# Patient Record
Sex: MALE | Race: WHITE | Employment: UNEMPLOYED | URBAN - NONMETROPOLITAN AREA
[De-identification: names, ages, dates, MRNs, and addresses within clinical notes are randomized per-mention and may not be internally consistent; named-entity substitution may affect disease eponyms.]

---

## 2020-09-14 ENCOUNTER — TELEPHONE (OUTPATIENT)
Dept: FAMILY MEDICINE CLINIC | Age: 70
End: 2020-09-14

## 2020-10-26 ENCOUNTER — TELEPHONE (OUTPATIENT)
Dept: SURGERY | Age: 70
End: 2020-10-26

## 2020-10-26 NOTE — TELEPHONE ENCOUNTER
MA attempted to contact patient to schedule an appointment for a 5 yr repeat colonoscopy consult. Patient did not answer so MA left message requesting a return call to schedule.     Electronically signed by Gwyn Davenport on 10/26/20 at 8:48 AM EDT

## 2021-03-21 ENCOUNTER — APPOINTMENT (OUTPATIENT)
Dept: GENERAL RADIOLOGY | Age: 71
End: 2021-03-21
Payer: MEDICARE

## 2021-03-21 ENCOUNTER — APPOINTMENT (OUTPATIENT)
Dept: CT IMAGING | Age: 71
End: 2021-03-21
Payer: MEDICARE

## 2021-03-21 ENCOUNTER — HOSPITAL ENCOUNTER (EMERGENCY)
Age: 71
Discharge: HOME | End: 2021-03-22
Payer: MEDICARE

## 2021-03-21 VITALS
SYSTOLIC BLOOD PRESSURE: 141 MMHG | OXYGEN SATURATION: 97 % | RESPIRATION RATE: 16 BRPM | TEMPERATURE: 97.1 F | DIASTOLIC BLOOD PRESSURE: 95 MMHG | HEART RATE: 77 BPM

## 2021-03-21 DIAGNOSIS — F10.920 ACUTE ALCOHOLIC INTOXICATION WITHOUT COMPLICATION (HCC): Primary | ICD-10-CM

## 2021-03-21 LAB
ACETAMINOPHEN LEVEL: <5 MCG/ML (ref 10–30)
ALBUMIN SERPL-MCNC: 3.9 G/DL (ref 3.5–5.2)
ALP BLD-CCNC: 89 U/L (ref 40–129)
ALT SERPL-CCNC: 12 U/L (ref 0–40)
AMPHETAMINE SCREEN, URINE: NOT DETECTED
ANION GAP SERPL CALCULATED.3IONS-SCNC: 17 MMOL/L (ref 7–16)
AST SERPL-CCNC: 24 U/L (ref 0–39)
BARBITURATE SCREEN URINE: NOT DETECTED
BASOPHILS ABSOLUTE: 0.05 E9/L (ref 0–0.2)
BASOPHILS RELATIVE PERCENT: 0.5 % (ref 0–2)
BENZODIAZEPINE SCREEN, URINE: NOT DETECTED
BILIRUB SERPL-MCNC: <0.2 MG/DL (ref 0–1.2)
BUN BLDV-MCNC: 11 MG/DL (ref 8–23)
CALCIUM SERPL-MCNC: 9.2 MG/DL (ref 8.6–10.2)
CANNABINOID SCREEN URINE: NOT DETECTED
CHLORIDE BLD-SCNC: 105 MMOL/L (ref 98–107)
CO2: 20 MMOL/L (ref 22–29)
COCAINE METABOLITE SCREEN URINE: NOT DETECTED
CREAT SERPL-MCNC: 0.9 MG/DL (ref 0.7–1.2)
EOSINOPHILS ABSOLUTE: 0.24 E9/L (ref 0.05–0.5)
EOSINOPHILS RELATIVE PERCENT: 2.5 % (ref 0–6)
ETHANOL: 281 MG/DL (ref 0–0.08)
FENTANYL SCREEN, URINE: NOT DETECTED
GFR AFRICAN AMERICAN: >60
GFR NON-AFRICAN AMERICAN: >60 ML/MIN/1.73
GLUCOSE BLD-MCNC: 108 MG/DL (ref 74–99)
HCT VFR BLD CALC: 45.1 % (ref 37–54)
HEMOGLOBIN: 15.1 G/DL (ref 12.5–16.5)
IMMATURE GRANULOCYTES #: 0.02 E9/L
IMMATURE GRANULOCYTES %: 0.2 % (ref 0–5)
LYMPHOCYTES ABSOLUTE: 4.55 E9/L (ref 1.5–4)
LYMPHOCYTES RELATIVE PERCENT: 47.8 % (ref 20–42)
Lab: NORMAL
MAGNESIUM: 2 MG/DL (ref 1.6–2.6)
MCH RBC QN AUTO: 32.5 PG (ref 26–35)
MCHC RBC AUTO-ENTMCNC: 33.5 % (ref 32–34.5)
MCV RBC AUTO: 97.2 FL (ref 80–99.9)
METHADONE SCREEN, URINE: NOT DETECTED
MONOCYTES ABSOLUTE: 0.66 E9/L (ref 0.1–0.95)
MONOCYTES RELATIVE PERCENT: 6.9 % (ref 2–12)
NEUTROPHILS ABSOLUTE: 4 E9/L (ref 1.8–7.3)
NEUTROPHILS RELATIVE PERCENT: 42.1 % (ref 43–80)
OPIATE SCREEN URINE: NOT DETECTED
OXYCODONE URINE: NOT DETECTED
PDW BLD-RTO: 12.8 FL (ref 11.5–15)
PHENCYCLIDINE SCREEN URINE: NOT DETECTED
PLATELET # BLD: 255 E9/L (ref 130–450)
PMV BLD AUTO: 9.6 FL (ref 7–12)
POTASSIUM SERPL-SCNC: 3.6 MMOL/L (ref 3.5–5)
RBC # BLD: 4.64 E12/L (ref 3.8–5.8)
SALICYLATE, SERUM: <0.3 MG/DL (ref 0–30)
SODIUM BLD-SCNC: 142 MMOL/L (ref 132–146)
TOTAL PROTEIN: 7.4 G/DL (ref 6.4–8.3)
TRICYCLIC ANTIDEPRESSANTS SCREEN SERUM: NEGATIVE NG/ML
TROPONIN: <0.01 NG/ML (ref 0–0.03)
WBC # BLD: 9.5 E9/L (ref 4.5–11.5)

## 2021-03-21 PROCEDURE — 83735 ASSAY OF MAGNESIUM: CPT

## 2021-03-21 PROCEDURE — 84484 ASSAY OF TROPONIN QUANT: CPT

## 2021-03-21 PROCEDURE — 80053 COMPREHEN METABOLIC PANEL: CPT

## 2021-03-21 PROCEDURE — 2580000003 HC RX 258: Performed by: NURSE PRACTITIONER

## 2021-03-21 PROCEDURE — 72125 CT NECK SPINE W/O DYE: CPT

## 2021-03-21 PROCEDURE — 87635 SARS-COV-2 COVID-19 AMP PRB: CPT

## 2021-03-21 PROCEDURE — 85025 COMPLETE CBC W/AUTO DIFF WBC: CPT

## 2021-03-21 PROCEDURE — 6360000002 HC RX W HCPCS: Performed by: NURSE PRACTITIONER

## 2021-03-21 PROCEDURE — 99284 EMERGENCY DEPT VISIT MOD MDM: CPT

## 2021-03-21 PROCEDURE — 36415 COLL VENOUS BLD VENIPUNCTURE: CPT

## 2021-03-21 PROCEDURE — 80307 DRUG TEST PRSMV CHEM ANLYZR: CPT

## 2021-03-21 PROCEDURE — 70450 CT HEAD/BRAIN W/O DYE: CPT

## 2021-03-21 PROCEDURE — 82077 ASSAY SPEC XCP UR&BREATH IA: CPT

## 2021-03-21 PROCEDURE — 71046 X-RAY EXAM CHEST 2 VIEWS: CPT

## 2021-03-21 PROCEDURE — 96374 THER/PROPH/DIAG INJ IV PUSH: CPT

## 2021-03-21 PROCEDURE — 93005 ELECTROCARDIOGRAM TRACING: CPT | Performed by: NURSE PRACTITIONER

## 2021-03-21 PROCEDURE — 80143 DRUG ASSAY ACETAMINOPHEN: CPT

## 2021-03-21 PROCEDURE — 96375 TX/PRO/DX INJ NEW DRUG ADDON: CPT

## 2021-03-21 PROCEDURE — 80179 DRUG ASSAY SALICYLATE: CPT

## 2021-03-21 RX ORDER — LORAZEPAM 2 MG/ML
2 INJECTION INTRAMUSCULAR
Status: DISCONTINUED | OUTPATIENT
Start: 2021-03-21 | End: 2021-03-22 | Stop reason: HOSPADM

## 2021-03-21 RX ORDER — LORAZEPAM 2 MG/ML
3 INJECTION INTRAMUSCULAR
Status: DISCONTINUED | OUTPATIENT
Start: 2021-03-21 | End: 2021-03-22 | Stop reason: HOSPADM

## 2021-03-21 RX ORDER — LORAZEPAM 1 MG/1
4 TABLET ORAL
Status: DISCONTINUED | OUTPATIENT
Start: 2021-03-21 | End: 2021-03-22 | Stop reason: HOSPADM

## 2021-03-21 RX ORDER — LORAZEPAM 1 MG/1
3 TABLET ORAL
Status: DISCONTINUED | OUTPATIENT
Start: 2021-03-21 | End: 2021-03-22 | Stop reason: HOSPADM

## 2021-03-21 RX ORDER — 0.9 % SODIUM CHLORIDE 0.9 %
1000 INTRAVENOUS SOLUTION INTRAVENOUS ONCE
Status: COMPLETED | OUTPATIENT
Start: 2021-03-21 | End: 2021-03-22

## 2021-03-21 RX ORDER — SODIUM CHLORIDE 0.9 % (FLUSH) 0.9 %
10 SYRINGE (ML) INJECTION PRN
Status: DISCONTINUED | OUTPATIENT
Start: 2021-03-21 | End: 2021-03-22 | Stop reason: HOSPADM

## 2021-03-21 RX ORDER — LORAZEPAM 2 MG/ML
1 INJECTION INTRAMUSCULAR
Status: DISCONTINUED | OUTPATIENT
Start: 2021-03-21 | End: 2021-03-22 | Stop reason: HOSPADM

## 2021-03-21 RX ORDER — 0.9 % SODIUM CHLORIDE 0.9 %
1000 INTRAVENOUS SOLUTION INTRAVENOUS ONCE
Status: DISCONTINUED | OUTPATIENT
Start: 2021-03-21 | End: 2021-03-22 | Stop reason: HOSPADM

## 2021-03-21 RX ORDER — THIAMINE HYDROCHLORIDE 100 MG/ML
100 INJECTION, SOLUTION INTRAMUSCULAR; INTRAVENOUS DAILY
Status: DISCONTINUED | OUTPATIENT
Start: 2021-03-21 | End: 2021-03-22 | Stop reason: HOSPADM

## 2021-03-21 RX ORDER — SODIUM CHLORIDE 0.9 % (FLUSH) 0.9 %
10 SYRINGE (ML) INJECTION EVERY 12 HOURS SCHEDULED
Status: DISCONTINUED | OUTPATIENT
Start: 2021-03-21 | End: 2021-03-22 | Stop reason: HOSPADM

## 2021-03-21 RX ORDER — LORAZEPAM 2 MG/ML
1 INJECTION INTRAMUSCULAR ONCE
Status: COMPLETED | OUTPATIENT
Start: 2021-03-21 | End: 2021-03-21

## 2021-03-21 RX ORDER — LORAZEPAM 2 MG/ML
4 INJECTION INTRAMUSCULAR
Status: DISCONTINUED | OUTPATIENT
Start: 2021-03-21 | End: 2021-03-22 | Stop reason: HOSPADM

## 2021-03-21 RX ORDER — LORAZEPAM 1 MG/1
1 TABLET ORAL
Status: DISCONTINUED | OUTPATIENT
Start: 2021-03-21 | End: 2021-03-22 | Stop reason: HOSPADM

## 2021-03-21 RX ORDER — LORAZEPAM 1 MG/1
2 TABLET ORAL
Status: DISCONTINUED | OUTPATIENT
Start: 2021-03-21 | End: 2021-03-22 | Stop reason: HOSPADM

## 2021-03-21 RX ADMIN — SODIUM CHLORIDE 1000 ML: 9 INJECTION, SOLUTION INTRAVENOUS at 19:50

## 2021-03-21 RX ADMIN — THIAMINE HYDROCHLORIDE 100 MG: 100 INJECTION, SOLUTION INTRAMUSCULAR; INTRAVENOUS at 19:53

## 2021-03-21 RX ADMIN — LORAZEPAM 1 MG: 2 INJECTION INTRAMUSCULAR; INTRAVENOUS at 19:53

## 2021-03-21 NOTE — ED PROVIDER NOTES
Independent   HPI:  3/21/21, Time: 6:36 PM EDT         Jb Hammonds is a 78 y.o. male presenting to the ED for alcohol intoxication. Patient presents to the emergency department after being brought in by EMS. Neighbors called police after patient was laying in the middle of the sidewalk. Upon police arrival patient noted to be intoxicated. EMS was called for transfer to the hospital.  EMS reports that patient actually lives right across the street from where he was. Patient is awake alert oriented x2-3, originally he thought that he was at his house. He is pleasant, and intoxicated. States that he was just celebrating his birthday which is actually in October. Patient states that he just wanted to celebrate early. Patient otherwise denies any chest pain, shortness of breath, abdominal pain also denies any nausea, vomiting or diarrhea. He does admit that he does drink alcohol daily, states that he drinks 6 beers and some whiskey today. Patient is denying any drug use. Patient states that he did fall and just decided to lay down on the ground instead of getting up. Patient is not on any anticoagulant therapy. Denies feeling weak or dizzy. Patient otherwise very pleasant. No associated depression denies any suicidal or homicidal ideations denies any hallucinations. Patient otherwise without any unusual weakness, there is no signs of injury or trauma on him as well as no unusual lacerations abrasions or areas of ecchymosis. Review of Systems:   A complete review of systems was performed and pertinent positives and negatives are stated within HPI, all other systems reviewed and are negative.          --------------------------------------------- PAST HISTORY ---------------------------------------------  Past Medical History:  has a past medical history of Hypertension and Urinary incontinence. Past Surgical History:  has no past surgical history on file.     Social History:  reports that he has been smoking. He has a 40.00 pack-year smoking history. He has never used smokeless tobacco. He reports current alcohol use of about 1.0 standard drinks of alcohol per week. He reports that he does not use drugs. Family History: family history includes Cancer in his father. The patients home medications have been reviewed. Allergies: Patient has no known allergies.     -------------------------------------------------- RESULTS -------------------------------------------------  All laboratory and radiology results have been personally reviewed by myself   LABS:  Results for orders placed or performed during the hospital encounter of 03/21/21   COVID-19, Rapid    Specimen: Nasopharyngeal Swab   Result Value Ref Range    SARS-CoV-2, NAAT Not Detected Not Detected   CBC Auto Differential   Result Value Ref Range    WBC 9.5 4.5 - 11.5 E9/L    RBC 4.64 3.80 - 5.80 E12/L    Hemoglobin 15.1 12.5 - 16.5 g/dL    Hematocrit 45.1 37.0 - 54.0 %    MCV 97.2 80.0 - 99.9 fL    MCH 32.5 26.0 - 35.0 pg    MCHC 33.5 32.0 - 34.5 %    RDW 12.8 11.5 - 15.0 fL    Platelets 735 344 - 246 E9/L    MPV 9.6 7.0 - 12.0 fL    Neutrophils % 42.1 (L) 43.0 - 80.0 %    Immature Granulocytes % 0.2 0.0 - 5.0 %    Lymphocytes % 47.8 (H) 20.0 - 42.0 %    Monocytes % 6.9 2.0 - 12.0 %    Eosinophils % 2.5 0.0 - 6.0 %    Basophils % 0.5 0.0 - 2.0 %    Neutrophils Absolute 4.00 1.80 - 7.30 E9/L    Immature Granulocytes # 0.02 E9/L    Lymphocytes Absolute 4.55 (H) 1.50 - 4.00 E9/L    Monocytes Absolute 0.66 0.10 - 0.95 E9/L    Eosinophils Absolute 0.24 0.05 - 0.50 E9/L    Basophils Absolute 0.05 0.00 - 0.20 E9/L   Comprehensive Metabolic Panel   Result Value Ref Range    Sodium 142 132 - 146 mmol/L    Potassium 3.6 3.5 - 5.0 mmol/L    Chloride 105 98 - 107 mmol/L    CO2 20 (L) 22 - 29 mmol/L    Anion Gap 17 (H) 7 - 16 mmol/L    Glucose 108 (H) 74 - 99 mg/dL    BUN 11 8 - 23 mg/dL    CREATININE 0.9 0.7 - 1.2 mg/dL    GFR Non-African American >60 >=60 mL/min/1.73    GFR African American >60     Calcium 9.2 8.6 - 10.2 mg/dL    Total Protein 7.4 6.4 - 8.3 g/dL    Albumin 3.9 3.5 - 5.2 g/dL    Total Bilirubin <0.2 0.0 - 1.2 mg/dL    Alkaline Phosphatase 89 40 - 129 U/L    ALT 12 0 - 40 U/L    AST 24 0 - 39 U/L   Magnesium   Result Value Ref Range    Magnesium 2.0 1.6 - 2.6 mg/dL   Urine Drug Screen   Result Value Ref Range    Amphetamine Screen, Urine NOT DETECTED Negative <1000 ng/mL    Barbiturate Screen, Ur NOT DETECTED Negative < 200 ng/mL    Benzodiazepine Screen, Urine NOT DETECTED Negative < 200 ng/mL    Cannabinoid Scrn, Ur NOT DETECTED Negative < 50ng/mL    Cocaine Metabolite Screen, Urine NOT DETECTED Negative < 300 ng/mL    Opiate Scrn, Ur NOT DETECTED Negative < 300ng/mL    PCP Screen, Urine NOT DETECTED Negative < 25 ng/mL    Methadone Screen, Urine NOT DETECTED Negative <300 ng/mL    Oxycodone Urine NOT DETECTED Negative <100 ng/mL    FENTANYL SCREEN, URINE NOT DETECTED Negative <1 ng/mL    Drug Screen Comment: see below    Serum Drug Screen   Result Value Ref Range    Ethanol Lvl 281 mg/dL    Acetaminophen Level <5.0 (L) 10.0 - 56.0 mcg/mL    Salicylate, Serum <3.2 0.0 - 30.0 mg/dL    TCA Scrn NEGATIVE Cutoff:300 ng/mL   Troponin   Result Value Ref Range    Troponin <0.01 0.00 - 0.03 ng/mL   Ethanol   Result Value Ref Range    Ethanol Lvl 149 mg/dL       RADIOLOGY:  Interpreted by Radiologist.  XR CHEST (2 VW)   Final Result   No acute process. CT HEAD WO CONTRAST   Final Result   No acute intracranial abnormality. CT CERVICAL SPINE WO CONTRAST   Final Result   No acute cervical spine fracture or malalignment. Prominent degenerative changes of the cervical spine.             ------------------------- NURSING NOTES AND VITALS REVIEWED ---------------------------   The nursing notes within the ED encounter and vital signs as below have been reviewed.    BP (!) 141/89   Pulse 80   Temp 97.1 °F (36.2 °C)   Resp 17   SpO2 95% Oxygen Saturation Interpretation: Normal      ---------------------------------------------------PHYSICAL EXAM--------------------------------------      Constitutional/General: Alert and oriented x3, intoxicated  Head: Normocephalic and atraumatic  Eyes: PERRL, EOMI  Mouth: Oropharynx clear, handling secretions, no trismus  Neck: Supple, full ROM,   Pulmonary: Lungs clear to auscultation bilaterally, no wheezes, rales, or rhonchi. Not in respiratory distress  Cardiovascular:  Regular rate and rhythm, no murmurs, gallops, or rubs. 2+ distal pulses  Abdomen: Soft, non tender, non distended,   Extremities: Moves all extremities x 4. Warm and well perfused  Skin: warm and dry without rash  Neurologic: GCS 15, cranial nerves II through XII grossly intact. No acute neurovascular deficit noted. Speech clear and coherent strength strong and equal.  Psych: Normal Affect      ------------------------------ ED COURSE/MEDICAL DECISION MAKING----------------------  Medications   0.9 % sodium chloride bolus (0 mLs Intravenous Stopped 3/22/21 0021)   LORazepam (ATIVAN) injection 1 mg (1 mg Intravenous Given 3/21/21 1953)         ED COURSE:       Medical Decision Making:    Plan will be for labs will also obtain imaging, will medicate for symptom relief. CIWA protocol initiated. Twelve-lead EKG interpreted showed a heart rate of 79, normal sinus rhythm, no acute ST elevation or depression noted. Left axis deviation. Labs resulted Covid test negative. CBC negative, chemistry panel unremarkable, magnesium level normal, urine drug screen negative. Initial alcohol level 281. Troponin negative. Patient with negative CT scan of the brain negative CT cervical spine of chest x-ray negative. Patient was started on a CIWA protocol. He was provided with 1 dose of Ativan. Patient completely awake alert oriented x4 he is requesting to leave but unable to find a sober ride home. Patient is not at all hypoxic.   He denies any pain denies any chest pain, shortness of breath or abdominal pain. Patient with repeat alcohol level of 149. Patient did eat 2 different meals here. He is overall completely nontoxic. He is able to make safe and sound decisions. Plan will be for discharge home. Patient made aware of avoiding alcohol the importance of seeking treatment for alcohol detox but is declining it at this time. Patient otherwise nontoxic. Patient will be safely discharged home    Counseling: The emergency provider has spoken with the patient and discussed todays results, in addition to providing specific details for the plan of care and counseling regarding the diagnosis and prognosis. Questions are answered at this time and they are agreeable with the plan.      --------------------------------- IMPRESSION AND DISPOSITION ---------------------------------    IMPRESSION  1. Acute alcoholic intoxication without complication ([facility] [address]        DISPOSITION  Disposition: Discharge to home  Patient condition is good      NOTE: This report was transcribed using voice recognition software.  Every effort was made to ensure accuracy; however, inadvertent computerized transcription errors may be present     Coral SIM Dennis CNP  03/22/21 7253

## 2021-03-22 LAB
EKG ATRIAL RATE: 79 BPM
EKG P AXIS: 66 DEGREES
EKG P-R INTERVAL: 164 MS
EKG Q-T INTERVAL: 416 MS
EKG QRS DURATION: 98 MS
EKG QTC CALCULATION (BAZETT): 477 MS
EKG R AXIS: 14 DEGREES
EKG T AXIS: 62 DEGREES
EKG VENTRICULAR RATE: 79 BPM
ETHANOL: 149 MG/DL (ref 0–0.08)
SARS-COV-2, NAAT: NOT DETECTED

## 2021-03-22 PROCEDURE — 82077 ASSAY SPEC XCP UR&BREATH IA: CPT

## 2021-03-22 PROCEDURE — 93010 ELECTROCARDIOGRAM REPORT: CPT | Performed by: INTERNAL MEDICINE

## 2022-11-02 ENCOUNTER — APPOINTMENT (OUTPATIENT)
Dept: CT IMAGING | Age: 72
DRG: 572 | End: 2022-11-02
Payer: MEDICARE

## 2022-11-02 ENCOUNTER — APPOINTMENT (OUTPATIENT)
Dept: GENERAL RADIOLOGY | Age: 72
DRG: 572 | End: 2022-11-02
Payer: MEDICARE

## 2022-11-02 ENCOUNTER — HOSPITAL ENCOUNTER (INPATIENT)
Age: 72
LOS: 2 days | Discharge: HOME HEALTH | DRG: 572 | End: 2022-11-04
Attending: EMERGENCY MEDICINE | Admitting: FAMILY MEDICINE
Payer: MEDICARE

## 2022-11-02 DIAGNOSIS — T78.3XXA ANGIOEDEMA, INITIAL ENCOUNTER: Primary | ICD-10-CM

## 2022-11-02 DIAGNOSIS — L08.9 WOUND INFECTION: ICD-10-CM

## 2022-11-02 DIAGNOSIS — T14.8XXA WOUND INFECTION: ICD-10-CM

## 2022-11-02 LAB
ALBUMIN SERPL-MCNC: 4.5 G/DL (ref 3.5–5.2)
ALP BLD-CCNC: 96 U/L (ref 40–129)
ALT SERPL-CCNC: 12 U/L (ref 0–40)
ANION GAP SERPL CALCULATED.3IONS-SCNC: 14 MMOL/L (ref 7–16)
AST SERPL-CCNC: 23 U/L (ref 0–39)
BASOPHILS ABSOLUTE: 0.03 E9/L (ref 0–0.2)
BASOPHILS RELATIVE PERCENT: 0.4 % (ref 0–2)
BILIRUB SERPL-MCNC: 0.3 MG/DL (ref 0–1.2)
BUN BLDV-MCNC: 12 MG/DL (ref 6–23)
CALCIUM SERPL-MCNC: 9.9 MG/DL (ref 8.6–10.2)
CHLORIDE BLD-SCNC: 105 MMOL/L (ref 98–107)
CO2: 23 MMOL/L (ref 22–29)
CREAT SERPL-MCNC: 1 MG/DL (ref 0.7–1.2)
EOSINOPHILS ABSOLUTE: 0.22 E9/L (ref 0.05–0.5)
EOSINOPHILS RELATIVE PERCENT: 2.7 % (ref 0–6)
GFR SERPL CREATININE-BSD FRML MDRD: >60 ML/MIN/1.73
GLUCOSE BLD-MCNC: 101 MG/DL (ref 74–99)
HCT VFR BLD CALC: 44.6 % (ref 37–54)
HEMOGLOBIN: 15 G/DL (ref 12.5–16.5)
IMMATURE GRANULOCYTES #: 0.02 E9/L
IMMATURE GRANULOCYTES %: 0.2 % (ref 0–5)
LYMPHOCYTES ABSOLUTE: 2.16 E9/L (ref 1.5–4)
LYMPHOCYTES RELATIVE PERCENT: 26.5 % (ref 20–42)
MCH RBC QN AUTO: 32.4 PG (ref 26–35)
MCHC RBC AUTO-ENTMCNC: 33.6 % (ref 32–34.5)
MCV RBC AUTO: 96.3 FL (ref 80–99.9)
MONOCYTES ABSOLUTE: 0.5 E9/L (ref 0.1–0.95)
MONOCYTES RELATIVE PERCENT: 6.1 % (ref 2–12)
NEUTROPHILS ABSOLUTE: 5.23 E9/L (ref 1.8–7.3)
NEUTROPHILS RELATIVE PERCENT: 64.1 % (ref 43–80)
PDW BLD-RTO: 12.4 FL (ref 11.5–15)
PLATELET # BLD: 294 E9/L (ref 130–450)
PMV BLD AUTO: 9.5 FL (ref 7–12)
POTASSIUM SERPL-SCNC: 4.3 MMOL/L (ref 3.5–5)
RBC # BLD: 4.63 E12/L (ref 3.8–5.8)
SODIUM BLD-SCNC: 142 MMOL/L (ref 132–146)
TOTAL PROTEIN: 8.2 G/DL (ref 6.4–8.3)
WBC # BLD: 8.2 E9/L (ref 4.5–11.5)

## 2022-11-02 PROCEDURE — 71260 CT THORAX DX C+: CPT

## 2022-11-02 PROCEDURE — 85025 COMPLETE CBC W/AUTO DIFF WBC: CPT

## 2022-11-02 PROCEDURE — 80053 COMPREHEN METABOLIC PANEL: CPT

## 2022-11-02 PROCEDURE — 99285 EMERGENCY DEPT VISIT HI MDM: CPT

## 2022-11-02 PROCEDURE — 6360000002 HC RX W HCPCS: Performed by: EMERGENCY MEDICINE

## 2022-11-02 PROCEDURE — 71045 X-RAY EXAM CHEST 1 VIEW: CPT

## 2022-11-02 PROCEDURE — 1200000000 HC SEMI PRIVATE

## 2022-11-02 PROCEDURE — 2580000003 HC RX 258: Performed by: EMERGENCY MEDICINE

## 2022-11-02 PROCEDURE — 87040 BLOOD CULTURE FOR BACTERIA: CPT

## 2022-11-02 PROCEDURE — 6370000000 HC RX 637 (ALT 250 FOR IP): Performed by: EMERGENCY MEDICINE

## 2022-11-02 PROCEDURE — 2500000003 HC RX 250 WO HCPCS: Performed by: EMERGENCY MEDICINE

## 2022-11-02 PROCEDURE — 96365 THER/PROPH/DIAG IV INF INIT: CPT

## 2022-11-02 PROCEDURE — 6360000004 HC RX CONTRAST MEDICATION: Performed by: RADIOLOGY

## 2022-11-02 RX ORDER — SODIUM CHLORIDE 0.9 % (FLUSH) 0.9 %
10 SYRINGE (ML) INJECTION PRN
Status: DISCONTINUED | OUTPATIENT
Start: 2022-11-02 | End: 2022-11-04 | Stop reason: HOSPADM

## 2022-11-02 RX ORDER — PREDNISONE 20 MG/1
60 TABLET ORAL ONCE
Status: COMPLETED | OUTPATIENT
Start: 2022-11-02 | End: 2022-11-02

## 2022-11-02 RX ORDER — POLYETHYLENE GLYCOL 3350 17 G/17G
17 POWDER, FOR SOLUTION ORAL DAILY PRN
Status: DISCONTINUED | OUTPATIENT
Start: 2022-11-02 | End: 2022-11-04 | Stop reason: HOSPADM

## 2022-11-02 RX ORDER — SODIUM CHLORIDE 0.9 % (FLUSH) 0.9 %
10 SYRINGE (ML) INJECTION EVERY 12 HOURS SCHEDULED
Status: DISCONTINUED | OUTPATIENT
Start: 2022-11-02 | End: 2022-11-04 | Stop reason: HOSPADM

## 2022-11-02 RX ORDER — TRANEXAMIC ACID 100 MG/ML
1000 INJECTION, SOLUTION INTRAVENOUS ONCE
Status: DISCONTINUED | OUTPATIENT
Start: 2022-11-02 | End: 2022-11-02

## 2022-11-02 RX ORDER — SODIUM CHLORIDE 9 MG/ML
INJECTION, SOLUTION INTRAVENOUS PRN
Status: DISCONTINUED | OUTPATIENT
Start: 2022-11-02 | End: 2022-11-04 | Stop reason: HOSPADM

## 2022-11-02 RX ORDER — ACETAMINOPHEN 650 MG/1
650 SUPPOSITORY RECTAL EVERY 6 HOURS PRN
Status: DISCONTINUED | OUTPATIENT
Start: 2022-11-02 | End: 2022-11-04 | Stop reason: HOSPADM

## 2022-11-02 RX ORDER — ENOXAPARIN SODIUM 100 MG/ML
40 INJECTION SUBCUTANEOUS DAILY
Status: DISCONTINUED | OUTPATIENT
Start: 2022-11-03 | End: 2022-11-04 | Stop reason: HOSPADM

## 2022-11-02 RX ORDER — ONDANSETRON 2 MG/ML
4 INJECTION INTRAMUSCULAR; INTRAVENOUS EVERY 6 HOURS PRN
Status: DISCONTINUED | OUTPATIENT
Start: 2022-11-02 | End: 2022-11-04 | Stop reason: HOSPADM

## 2022-11-02 RX ORDER — ACETAMINOPHEN 325 MG/1
650 TABLET ORAL EVERY 6 HOURS PRN
Status: DISCONTINUED | OUTPATIENT
Start: 2022-11-02 | End: 2022-11-04 | Stop reason: HOSPADM

## 2022-11-02 RX ORDER — ALBUTEROL SULFATE 2.5 MG/3ML
2.5 SOLUTION RESPIRATORY (INHALATION) EVERY 6 HOURS PRN
Status: DISCONTINUED | OUTPATIENT
Start: 2022-11-02 | End: 2022-11-04 | Stop reason: HOSPADM

## 2022-11-02 RX ORDER — AMLODIPINE BESYLATE 10 MG/1
10 TABLET ORAL DAILY
Status: DISCONTINUED | OUTPATIENT
Start: 2022-11-03 | End: 2022-11-04 | Stop reason: HOSPADM

## 2022-11-02 RX ORDER — PROMETHAZINE HYDROCHLORIDE 12.5 MG/1
12.5 TABLET ORAL EVERY 6 HOURS PRN
Status: DISCONTINUED | OUTPATIENT
Start: 2022-11-02 | End: 2022-11-04 | Stop reason: HOSPADM

## 2022-11-02 RX ADMIN — IOPAMIDOL 60 ML: 755 INJECTION, SOLUTION INTRAVENOUS at 23:39

## 2022-11-02 RX ADMIN — PREDNISONE 60 MG: 20 TABLET ORAL at 20:21

## 2022-11-02 RX ADMIN — CEFEPIME 2000 MG: 2 INJECTION, POWDER, FOR SOLUTION INTRAVENOUS at 21:11

## 2022-11-02 RX ADMIN — TRANEXAMIC ACID 1000 MG: 100 INJECTION, SOLUTION INTRAVENOUS at 16:05

## 2022-11-02 RX ADMIN — VANCOMYCIN HYDROCHLORIDE 1750 MG: 10 INJECTION, POWDER, LYOPHILIZED, FOR SOLUTION INTRAVENOUS at 21:49

## 2022-11-02 ASSESSMENT — PAIN - FUNCTIONAL ASSESSMENT: PAIN_FUNCTIONAL_ASSESSMENT: NONE - DENIES PAIN

## 2022-11-02 NOTE — ED PROVIDER NOTES
Department of Emergency Medicine   ED  Provider Note  Admit Date/RoomTime: 11/2/2022  3:46 PM  ED Room: 07/07          History of Present Illness:  11/2/22, Time: 5:24 PM EDT  Chief Complaint   Patient presents with    Other     [facility], swelling to upper lip, denies SOB, pt reports swelling started around noon today. Pt is on Lisinopril daily                Blayne Lee is a 81 y.o. male presenting to the ED for angioedema. Patient states his lip began to swell around 10 AM this morning. Came on gradually, nothing makes it better or worse, no associated pain. He is on lisinopril. It got worse around noon, was instructed to present to the ER. Denies any throat swelling. Denies difficulty clearing secretions. Denies any shortness of breath. Denies any fever, chills, nausea, vomiting, change in bowel or bladder, paresthesias, or any other symptoms or complaints. Review of Systems:   Pertinent positives and negatives are stated within HPI, all other systems reviewed and are negative.        --------------------------------------------- PAST HISTORY ---------------------------------------------  Past Medical History:  has a past medical history of Hypertension and Urinary incontinence. Past Surgical History:  has no past surgical history on file. Social History:  reports that he has been smoking. He has a 40.00 pack-year smoking history. He has never used smokeless tobacco. He reports current alcohol use of about 1.0 standard drink per week. He reports that he does not use drugs. Family History: family history includes Cancer in his father. . Unless otherwise noted, family history is non contributory    The patients home medications have been reviewed.     Allergies: Lisinopril        ---------------------------------------------------PHYSICAL EXAM--------------------------------------    Constitutional/General: Alert and oriented x3  Head: Normocephalic and atraumatic  Eyes: PERRL, EOMI, sclera non icteric  Mouth: Oropharynx clear, handling secretions, no trismus, no asymmetry of the posterior oropharynx or uvular edema. Significant upper lip edema  Neck: Supple, full ROM, no stridor, no meningeal signs  Respiratory: Lungs clear to auscultation bilaterally, no wheezes, rales, or rhonchi. Not in respiratory distress  Cardiovascular:  Regular rate. Regular rhythm. 2+ distal pulses. Equal extremity pulses. Chest: No chest wall tenderness. Large draining necrotic wound to the left pectoral area, no crepitus  GI:  Abdomen Soft, Non tender, Non distended. No rebound, guarding, or rigidity. No pulsatile masses. Musculoskeletal: Moves all extremities x 4. Warm and well perfused, no clubbing, cyanosis, or edema. Capillary refill <3 seconds  Integument: skin warm and dry. No rashes. Neurologic: GCS 15, no focal deficits, symmetric strength 5/5 in the upper and lower extremities bilaterally  Psychiatric: Normal Affect          -------------------------------------------------- RESULTS -------------------------------------------------  I have personally reviewed all laboratory and imaging results for this patient. Results are listed below.      LABS: (Lab results interpreted by me)  No results found for this visit on 11/02/22.,       RADIOLOGY:  Interpreted by Radiologist unless otherwise specified  XR CHEST PORTABLE    (Results Pending)         EKG Interpretation  Interpreted by emergency department physician, Dr. Eduar Hayes             ------------------------- NURSING NOTES AND VITALS REVIEWED ---------------------------   The nursing notes within the ED encounter and vital signs as below have been reviewed by myself  BP (!) 188/118   Pulse 80   Temp 98.1 °F (36.7 °C) (Oral)   Resp 18   Ht 5' 9\" (1.753 m)   Wt 187 lb (84.8 kg)   SpO2 96%   BMI 27.62 kg/m²     Oxygen Saturation Interpretation: Normal    The patients available past medical records and past encounters were reviewed. ------------------------------ ED COURSE/MEDICAL DECISION MAKING----------------------  Medications   vancomycin (VANCOCIN) 1,750 mg in dextrose 5 % 500 mL IVPB (has no administration in time range)   cefepime (MAXIPIME) 2,000 mg in sodium chloride 0.9 % 50 mL IVPB ([id number]) (has no administration in time range)   tranexamic acid (CYKLOKAPRON) 1,000 mg in sodium chloride 0.9 % 100 mL IVPB (0 mg IntraVENous Stopped 11/2/22 1654)   predniSONE (DELTASONE) tablet 60 mg (60 mg Oral Given 11/2/22 2021)           The cardiac monitor revealed sinus with a heart rate in the 80s as interpreted by me. The cardiac monitor was ordered secondary to the patient's angioedema and to monitor the patient for dysrhythmia. CPT [id number]         Medical Decision Making:    Patient was given TXA. Labs and imaging are pending. Reevaluation, angioedema is much improved. Swelling improved, airway pain, overall well-appearing. However, given his necrotic wound, surgery consulted, patient be admitted. Please note that the withdrawal or failure to initiate urgent interventions for this patient would likely result in a life threatening deterioration or permanent disability. Accordingly this patient received 30 minutes of critical care time, excluding separately billable procedures. Counseling: The emergency provider has spoken with the patient and discussed todays results, in addition to providing specific details for the plan of care and counseling regarding the diagnosis and prognosis. Questions are answered at this time and they are agreeable with the plan.       --------------------------------- IMPRESSION AND DISPOSITION ---------------------------------    IMPRESSION  1. Angioedema, initial encounter    2. Wound infection        DISPOSITION  Disposition: Admit to telemetry  Patient condition is stable        NOTE: This report was transcribed using voice recognition software.  Every effort was made to ensure accuracy; however, inadvertent computerized transcription errors may be present        Gisel Rivas MD  11/02/22 6575       Gisel Rivas MD  11/02/22 2752

## 2022-11-03 LAB
ANION GAP SERPL CALCULATED.3IONS-SCNC: 13 MMOL/L (ref 7–16)
BASOPHILS ABSOLUTE: 0.01 E9/L (ref 0–0.2)
BASOPHILS RELATIVE PERCENT: 0.1 % (ref 0–2)
BUN BLDV-MCNC: 10 MG/DL (ref 6–23)
CALCIUM SERPL-MCNC: 9.8 MG/DL (ref 8.6–10.2)
CHLORIDE BLD-SCNC: 103 MMOL/L (ref 98–107)
CO2: 23 MMOL/L (ref 22–29)
CREAT SERPL-MCNC: 0.9 MG/DL (ref 0.7–1.2)
EOSINOPHILS ABSOLUTE: 0 E9/L (ref 0.05–0.5)
EOSINOPHILS RELATIVE PERCENT: 0 % (ref 0–6)
GFR SERPL CREATININE-BSD FRML MDRD: >60 ML/MIN/1.73
GLUCOSE BLD-MCNC: 152 MG/DL (ref 74–99)
HCT VFR BLD CALC: 45.5 % (ref 37–54)
HEMOGLOBIN: 15.6 G/DL (ref 12.5–16.5)
IMMATURE GRANULOCYTES #: 0.03 E9/L
IMMATURE GRANULOCYTES %: 0.4 % (ref 0–5)
LYMPHOCYTES ABSOLUTE: 1.03 E9/L (ref 1.5–4)
LYMPHOCYTES RELATIVE PERCENT: 13.9 % (ref 20–42)
MCH RBC QN AUTO: 33.4 PG (ref 26–35)
MCHC RBC AUTO-ENTMCNC: 34.3 % (ref 32–34.5)
MCV RBC AUTO: 97.4 FL (ref 80–99.9)
MONOCYTES ABSOLUTE: 0.08 E9/L (ref 0.1–0.95)
MONOCYTES RELATIVE PERCENT: 1.1 % (ref 2–12)
NEUTROPHILS ABSOLUTE: 6.24 E9/L (ref 1.8–7.3)
NEUTROPHILS RELATIVE PERCENT: 84.5 % (ref 43–80)
PDW BLD-RTO: 12.3 FL (ref 11.5–15)
PLATELET # BLD: 304 E9/L (ref 130–450)
PMV BLD AUTO: 9.3 FL (ref 7–12)
POTASSIUM REFLEX MAGNESIUM: 4.4 MMOL/L (ref 3.5–5)
RBC # BLD: 4.67 E12/L (ref 3.8–5.8)
SODIUM BLD-SCNC: 139 MMOL/L (ref 132–146)
WBC # BLD: 7.4 E9/L (ref 4.5–11.5)

## 2022-11-03 PROCEDURE — 6370000000 HC RX 637 (ALT 250 FOR IP): Performed by: FAMILY MEDICINE

## 2022-11-03 PROCEDURE — 99221 1ST HOSP IP/OBS SF/LOW 40: CPT | Performed by: SURGERY

## 2022-11-03 PROCEDURE — 36415 COLL VENOUS BLD VENIPUNCTURE: CPT

## 2022-11-03 PROCEDURE — 1200000000 HC SEMI PRIVATE

## 2022-11-03 PROCEDURE — 2580000003 HC RX 258: Performed by: FAMILY MEDICINE

## 2022-11-03 PROCEDURE — 87205 SMEAR GRAM STAIN: CPT

## 2022-11-03 PROCEDURE — 0JB60ZZ EXCISION OF CHEST SUBCUTANEOUS TISSUE AND FASCIA, OPEN APPROACH: ICD-10-PCS

## 2022-11-03 PROCEDURE — 80048 BASIC METABOLIC PNL TOTAL CA: CPT

## 2022-11-03 PROCEDURE — 87186 SC STD MICRODIL/AGAR DIL: CPT

## 2022-11-03 PROCEDURE — 6360000002 HC RX W HCPCS: Performed by: FAMILY MEDICINE

## 2022-11-03 PROCEDURE — 87070 CULTURE OTHR SPECIMN AEROBIC: CPT

## 2022-11-03 PROCEDURE — 87075 CULTR BACTERIA EXCEPT BLOOD: CPT

## 2022-11-03 PROCEDURE — 85025 COMPLETE CBC W/AUTO DIFF WBC: CPT

## 2022-11-03 PROCEDURE — 87077 CULTURE AEROBIC IDENTIFY: CPT

## 2022-11-03 PROCEDURE — 88304 TISSUE EXAM BY PATHOLOGIST: CPT

## 2022-11-03 RX ADMIN — CEFEPIME 2000 MG: 2 INJECTION, POWDER, FOR SOLUTION INTRAVENOUS at 09:03

## 2022-11-03 RX ADMIN — SODIUM CHLORIDE, PRESERVATIVE FREE 10 ML: 5 INJECTION INTRAVENOUS at 00:50

## 2022-11-03 RX ADMIN — VANCOMYCIN HYDROCHLORIDE 1500 MG: 10 INJECTION, POWDER, LYOPHILIZED, FOR SOLUTION INTRAVENOUS at 15:57

## 2022-11-03 RX ADMIN — AMLODIPINE BESYLATE 10 MG: 10 TABLET ORAL at 08:59

## 2022-11-03 RX ADMIN — CEFEPIME 2000 MG: 2 INJECTION, POWDER, FOR SOLUTION INTRAVENOUS at 21:02

## 2022-11-03 RX ADMIN — SODIUM CHLORIDE, PRESERVATIVE FREE 10 ML: 5 INJECTION INTRAVENOUS at 09:04

## 2022-11-03 RX ADMIN — ENOXAPARIN SODIUM 40 MG: 100 INJECTION SUBCUTANEOUS at 09:00

## 2022-11-03 RX ADMIN — SODIUM CHLORIDE, PRESERVATIVE FREE 10 ML: 5 INJECTION INTRAVENOUS at 21:02

## 2022-11-03 NOTE — CARE COORDINATION
Per notes presented to ED for swelling of his upper lip-patient was being prepared to be discharged when it was noticed that he has a large wound on the left chest that is draining pus. Patient reports he has had this wound for about a week or 2. Patient was given cefepime and vancomycin. IV cefepime q 12. General surgery was consulted. Bedside I&D done Wound was packed with iodoform covered with a heavy drainage pad. Met with patient to discuss role/transition of care. He lives alone in an apartment on 10 th floor with elevator access. He has no family support. He does not have a PCP. No DME,DANIELLE OR HHC. He works @ [employer] on [employer] and [employer]. His discharge plan is to return home and will ride the bus home. Electronically signed by Yulia Ramirez RN on 11/3/2022 at 1:54 PM

## 2022-11-03 NOTE — PROGRESS NOTES
Pharmacy Consultation Note  (Antibiotic Dosing and Monitoring)    Initial consult date: 11/3/2022  Consulting physician/provider: Micheal Macias DO  Drug: Vancomycin  Indication: Skin & Soft Tissue Infection    Age/  Gender Height Weight IBW  Allergy Information   72 y.o./male 5' 9\" (175.3 cm) 187 lb (84.8 kg)     Ideal body weight: 70.7 kg (155 lb 13.8 oz)  Adjusted ideal body weight: 76.3 kg (168 lb 5.1 oz)   Lisinopril      Renal Function:  Recent Labs     11/02/22 2033   BUN 12   CREATININE 1.0     No intake or output data in the 24 hours ending 11/03/22 0002    Vancomycin Monitoring:  Trough:  No results for input(s): VANCOTROUGH in the last 72 hours. Random:  No results for input(s): VANCORANDOM in the last 72 hours. No results for input(s): Reina Minus in the last 72 hours. Historical Cultures:  Organism   Date Value Ref Range Status   01/20/2016 Coagulase Negative Staphylococci (A)  Final     No results for input(s): BC in the last 72 hours. Vancomycin Administration Times:  Recent vancomycin administrations                     vancomycin (VANCOCIN) 1,750 mg in dextrose 5 % 500 mL IVPB (mg) 1,750 mg New Bag 11/02/22 2149                    Assessment:  Patient is a 81 y.o. male who has been initiated on vancomycin  Estimated Creatinine Clearance: 69 mL/min (based on SCr of 1 mg/dL). Plan:   Will check vancomycin levels when appropriate  Will continue to monitor renal function   Pharmacy to follow      Joanne Marquez PharmD, [facility], BCPS 11/3/2022 12:02 AM

## 2022-11-03 NOTE — PROGRESS NOTES
Physician Progress Note      PATIENT:               Lai Parks  CSN #:                  [id number]  :                       1950  ADMIT DATE:       2022 3:46 PM  [address] [place] DATE:  RESPONDING  PROVIDER #:        MOIRA WAGNER          QUERY TEXT:    Patient admitted with angioedema and noted to have a left chest wound. Per Op   note dated 11/3 documentation of debridement. To accurately reflect the   procedure performed please document if debridement was excisional or   nonexcisional and the deepest depth of tissue removed as down to and   including: The medical record reflects the following:  Risk Factors: left chest wound  Clinical Indicators: per procedure note 11/3 An incision through area of   fluctuance was made using an 11 blade. Thick necrotic debris was expelled. Using blunt dissection, the tissue was debrided back until healthy, viable,   bleeding tissue was obtained; Wound was packed with iodoform covered with a   heavy drainage pad. Treatment: debridement, wound care and dressing changes    Thank you  Brigette ROMERON, RN, CCDS  Clinical Documentation Improvement  Options provided:  -- Nonexcisional debridement of skin  -- Excisional debridement of skin  -- Nonexcisional debridement of subcutaneous tissue  -- Excisional debridement of subcutaneous tissue  -- Nonexcisional debridement of fascia  -- Excisional debridement of fascia  -- Other - I will add my own diagnosis  -- Disagree - Not applicable / Not valid  -- Disagree - Clinically unable to determine / Unknown  -- Refer to Clinical Documentation Reviewer    PROVIDER RESPONSE TEXT:    Excisional debridement of subcutaneous tissue of left chest was performed   during procedure on 11/3.     Query created by: Leopold Coin on 5498 3:15 PM      Electronically signed by:  Eve Wilkins 11/3/2022 7:09 PM

## 2022-11-03 NOTE — PROGRESS NOTES
Pharmacy Consultation Note  (Antibiotic Dosing and Monitoring)    Initial consult date: 11/3/2022  Consulting physician/provider: Lisa Middleton DO  Drug: Vancomycin  Indication: Skin & Soft Tissue Infection    Age/  Gender Height Weight IBW  Allergy Information   72 y.o./male 5' 9\" (175.3 cm) 187 lb (84.8 kg)     Ideal body weight: 70.7 kg (155 lb 13.8 oz)  Adjusted ideal body weight: 76.3 kg (168 lb 5.1 oz)   Lisinopril      Renal Function:  Recent Labs     11/02/22 2033 11/03/22  0445   BUN 12 10   CREATININE 1.0 0.9     No intake or output data in the 24 hours ending 11/03/22 0809    Vancomycin Monitoring:  Trough:  No results for input(s): VANCOTROUGH in the last 72 hours. Random:  No results for input(s): VANCORANDOM in the last 72 hours. No results for input(s): Jocelyn Dage in the last 72 hours. Historical Cultures:  Organism   Date Value Ref Range Status   01/20/2016 Coagulase Negative Staphylococci (A)  Final     No results for input(s): BC in the last 72 hours. Vancomycin Administration Times:  Recent vancomycin administrations                     vancomycin (VANCOCIN) 1,750 mg in dextrose 5 % 500 mL IVPB (mg) 1,750 mg New Bag 11/02/22 2149             Assessment:  Patient is a 81 y.o. male who has been initiated on vancomycin and cefepime for necrotic L pec wound 2/2 abscess. S/p bedside I&D on 11/3. Patient received vancomycin 1,750 mg IV x 1 in ED. Estimated Creatinine Clearance: 76 mL/min (based on SCr of 0.9 mg/dL). Plan:  Initiate vancomycin 1,500 mg IV every 18 hours (eAUC/IVANNA = 540, Trough = 16 mcg/mL). Will check random vancomycin level tomorrow (11/4) with morning labs. Will continue to monitor renal function; will check SCr tomorrow. Pharmacy to follow.     Lalita Simons, PharmD  PGY1 Pharmacy Resident 11/3/2022 8:10 AM

## 2022-11-03 NOTE — CONSULTS
GENERAL SURGERY  CONSULT NOTE  11/3/2022    Physician Consulted: Dr. Latasha Gillespie  Reason for Consult: L pec wound  Referring Physician: Dr. Eduar Hayes     PER Hammond is a 81 y.o. male who initially presented to the emergency present for angioedema as a reaction to his lisinopril, however was found to have a left pec necrotic wound. Patient states he has noticed this wound for the past 2 weeks. States he likely had a scab there was picking at it. He started noticing some drainage but did not think much of it. Denies any fevers or chills. No nausea or vomiting. Denies any pain around the site. He denies noticing any previous breast masses. Denies any recent weight loss, night sweats. Reports a 40 pack year hx. States he recently quit smoking. He denies any family history of breast cancer. Denies any family history of cancer. Denies any history of diabetes. WBC 8.2      Past Medical History:   Diagnosis Date    Hypertension     Urinary incontinence        No past surgical history on file. Medications Prior to Admission:    Prior to Admission medications    Medication Sig Start Date End Date Taking? Authorizing Provider   amLODIPine (NORVASC) 10 MG tablet Take 1 tablet by mouth daily 16   Ata Yamilet, DO   albuterol sulfate HFA (PROAIR HFA) 108 (90 BASE) MCG/ACT inhaler Inhale 2 puffs into the lungs every 6 hours as needed for Wheezing 16   Ata Yamilet, DO       Allergies   Allergen Reactions    Lisinopril Angioedema       Family History   Problem Relation Age of Onset    Cancer Father        Social History     Tobacco Use    Smoking status: Some Days     Packs/day: 1.00     Years: 40.00     Pack years: 40.00     Types: Cigarettes     Last attempt to quit: 10/11/2015     Years since quittin.0    Smokeless tobacco: Never    Tobacco comments:     Former Smoker, quit 1 month ago. Substance Use Topics    Alcohol use:  Yes     Alcohol/week: 1.0 standard drink     Types: 1 Cans of beer per week    Drug use: No         Review of Systems   General ROS: negative  Hematological and Lymphatic ROS: negative  Respiratory ROS: no cough, shortness of breath, or wheezing  Cardiovascular ROS: no chest pain or dyspnea on exertion  Gastrointestinal ROS: no abdominal pain, change in bowel habits, or black or bloody stools  Genito-Urinary ROS: no dysuria, trouble voiding, or hematuria  Musculoskeletal ROS: negative      PHYSICAL EXAM:    Vitals:    11/03/22 0045   BP: (!) 172/105   Pulse: 71   Resp: 15   Temp:    SpO2: 95%       General Appearance:  awake, alert, oriented, in no acute distress   Skin:  necrotic L chest wound      Head/face:  angioedema of lips   Eyes:  No gross abnormalities. Lungs:  Normal expansion. Clear to auscultation. No rales, rhonchi, or wheezing. Heart:  Heart regular rate and rhythm. R chest palpable mass   Abdomen:  Soft, non-tender, normal bowel sounds. No bruits, organomegaly or masses. Extremities: Extremities warm to touch, pink, with no edema. L axillary LAD     LABS:  CBC  Recent Labs     11/02/22 2033   WBC 8.2   HGB 15.0   HCT 44.6        BMP  Recent Labs     11/02/22 2033      K 4.3      CO2 23   BUN 12   CREATININE 1.0   CALCIUM 9.9     Liver Function  Recent Labs     11/02/22 2033   BILITOT 0.3   AST 23   ALT 12   ALKPHOS 96   PROT 8.2   LABALBU 4.5     No results for input(s): LACTATE in the last 72 hours. No results for input(s): INR, PTT in the last 72 hours. Invalid input(s): PT    RADIOLOGY    XR CHEST PORTABLE    Result Date: 11/2/2022  EXAMINATION: ONE XRAY VIEW OF THE CHEST 11/2/2022 8:38 pm COMPARISON: 03/21/2021 HISTORY: ORDERING SYSTEM PROVIDED HISTORY: Shortness of breath TECHNOLOGIST PROVIDED HISTORY: Reason for exam:->Shortness of breath What reading provider will be dictating this exam?->CRC FINDINGS: The lungs are without acute focal process. There is no effusion or pneumothorax.  The cardiomediastinal silhouette is without acute process. The osseous structures are without acute process. No acute process.          ASSESSMENT:  81 y.o. male with necrotic L pec wound likely 2/2 to abscess     PLAN:  - CT chest with contrast   - bedside debridement of the wound   - will obtain culture and surgical pathology   - continue IV abx     Electronically signed by Jennifer Paula MD on 11/3/22 at 1:06 AM EDT

## 2022-11-03 NOTE — H&P
Hospitalist History & Physical      PCP: Lisandra Irizarry MD    Date of Service: Pt seen/examined on 11/2/2022     Chief Complaint:  had concerns including Other (Pt presents to pivot desk with angioedema, swelling to upper lip, denies SOB, pt reports swelling started around noon today. Pt is on Lisinopril daily). History Of Present Illness:    Mr. Len Osuna, a 81y.o. year old male  who  has a past medical history of Hypertension and Urinary incontinence. Patient presented to the emergency department with swelling of his upper lip. This began about 10:00 this morning. Became much worse around noon thus prompting him to go to the emergency department. Vital signs are within normal limits and stable although he is hypertensive with a pressure of 188/118. The patient is afebrile. Work-up was essentially unremarkable including laboratory studies and imaging. There was some improvement with his lip swelling and the patient was being prepared to be discharged when it was noticed that he has a large wound on the left chest that is draining pus. Patient reports he has had this wound for about a week or 2. Patient was given cefepime and vancomycin. General surgery was consulted. Medicine was consulted for admission. Past Medical History:   Diagnosis Date    Hypertension     Urinary incontinence        No past surgical history on file. Prior to Admission medications    Medication Sig Start Date End Date Taking? Authorizing Provider   amLODIPine (NORVASC) 10 MG tablet Take 1 tablet by mouth daily 6/1/16   Isabel Montesinos DO   albuterol sulfate HFA (PROAIR HFA) 108 (90 BASE) MCG/ACT inhaler Inhale 2 puffs into the lungs every 6 hours as needed for Wheezing 1/20/16   Isabel Montesinos DO         Allergies:  Lisinopril    Social History:    TOBACCO:   reports that he has been smoking. He has a 40.00 pack-year smoking history.  He has never used smokeless tobacco.  ETOH:   reports current alcohol use of about 1.0 standard drink per week. Family History:    Reviewed in detail and negative for DM, CAD, Cancer, CVA. Positive as follows\"      Problem Relation Age of Onset    Cancer Father        REVIEW OF SYSTEMS:   Pertinent positives as noted in the HPI. All other systems reviewed and negative. PHYSICAL EXAM:  BP (!) 188/118   Pulse 80   Temp 98.1 °F (36.7 °C) (Oral)   Resp 18   Ht 5' 9\" (1.753 m)   Wt 187 lb (84.8 kg)   SpO2 96%   BMI 27.62 kg/m²   General appearance: No apparent distress, appears stated age and cooperative. HEENT: Normal cephalic, atraumatic without obvious deformity. Pupils equal, round, and reactive to light. Extra ocular muscles intact. Conjunctivae/corneas clear. Upper lip edema  Neck: Supple, with full range of motion. No jugular venous distention. Trachea midline. Respiratory: Clear to auscultation bilaterally  Cardiovascular: Regular rate and rhythm  Abdomen: Soft, nontender, nondistended  Musculoskeletal: No clubbing, cyanosis, edema of bilateral lower extremities. Brisk capillary refill. Skin: Wound to left chest with purulent drainage  Neurologic:  Neurovascularly intact without any focal sensory/motor deficits. Cranial nerves: II-XII intact, grossly non-focal.  Psychiatric: Alert and oriented, thought content appropriate, normal insight    Reviewed EKG and CXR personally      CBC:   Recent Labs     11/02/22 2033   WBC 8.2   RBC 4.63   HGB 15.0   HCT 44.6   MCV 96.3   RDW 12.4        BMP: No results for input(s): NA, K, CL, CO2, BUN, CREATININE, CA, MG, PHOS in the last 72 hours. LFT:  No results for input(s): PROT, ALB, ALKPHOS, ALT, AST, BILITOT, AMYLASE, LIPASE in the last 72 hours. CE:  No results for input(s): [place] in the last 72 hours. PT/INR: No results for input(s): INR, APTT in the last 72 hours. BNP: No results for input(s): BNP in the last 72 hours.   ESR: No results found for: SEDRATE  CRP: No results found for: CRP  D Dimer: No results found for: DDIMER   Folate and B12: No results found for: [id number], No results found for: FOLATE  Lactic Acid: No results found for: LACTA  Thyroid Studies: No results found for: TSH, B3GZWQR, V0XCKVI, THYROIDAB    Oupatient labs:  Lab Results   Component Value Date    CHOL 202 (H) 07/07/2015    TRIG 121 07/07/2015    HDL 69 07/07/2015    LDLCALC 109 (H) 07/07/2015    PSA 0.74 10/12/2015    INR 1.1 12/18/2015       Urinalysis:    Lab Results   Component Value Date/Time    BLOODU neg 08/11/2015 10:53 AM    SPECGRAV 1.030 08/11/2015 10:53 AM    GLUCOSEU neg 08/11/2015 10:53 AM       Imaging:  No results found. ASSESSMENT:  -Left chest wound infection  -Angioedema  -Hypertension, poorly controlled      PLAN:  -Admit to medicine  -Consult general surgery  -Pharmacy to dose vancomycin  -Cefepime 2000 g twice daily  -Blood and tissue cultures  -Telemetry  -Continue home medications        Diet: No diet orders on file  Code Status: Prior  Surrogate decision maker confirmed with patient:   Extended Emergency Contact Information  Primary Emergency Contact: [address] [place] [address] Phone: [phone]  Relation: Other  Secondary Emergency Contact: [address] Phone: [phone]  Mobile Phone: [phone]  Relation: Brother/Sister    DVT Prophylaxis: []Lovenox []Heparin []PCD [] [address] []Encouraged ambulation  Disposition: []Med/Surg [] Intermediate [] ICU/CCU  Admit status: [] Observation [] Inpatient     +++++++++++++++++++++++++++++++++++++++++++++++++  Kel Danielle, DO  +++++++++++++++++++++++++++++++++++++++++++++++++  NOTE: This report was transcribed using voice recognition software. Every effort was made to ensure accuracy; however, inadvertent computerized transcription errors may be present.

## 2022-11-03 NOTE — PLAN OF CARE
Problem: Skin/Tissue Integrity  Goal: Absence of new skin breakdown  Description: 1. Monitor for areas of redness and/or skin breakdown  2. Assess vascular access sites hourly  3. Every 4-6 hours minimum:  Change oxygen saturation probe site  4. Every 4-6 hours:  If on nasal continuous positive airway pressure, respiratory therapy assess nares and determine need for appliance change or resting period.   11/3/2022 1036 by Usha Hassan RN  Outcome: Progressing  11/3/2022 1036 by Usha Hassan RN  Outcome: Progressing     Problem: Safety - Adult  Goal: Free from fall injury  11/3/2022 1036 by Usha Hassan RN  Outcome: Progressing  11/3/2022 1036 by Usha Hassan RN  Outcome: Progressing

## 2022-11-03 NOTE — PROGRESS NOTES
[address]      PCP: Milly Bueno MD    Date of Service: Pt seen/examined on 11/3/2022     Chief Complaint:  had concerns including Other (Pt presents to pivot desk with angioedema, swelling to upper lip, denies SOB, pt reports swelling started around noon today. Pt is on Lisinopril daily). History Of Present Illness: Patient history reviewed with interview at bedside as transcribed by the admitting physician  Mr. Barbara Razo, a 81y.o. year old male  who  has a past medical history of Hypertension and Urinary incontinence. Patient presented to the emergency department with swelling of his upper lip. This began about 10:00 this morning. Became much worse around noon thus prompting him to go to the emergency department. Vital signs are within normal limits and stable although he is hypertensive with a pressure of 188/118. The patient is afebrile. Work-up was essentially unremarkable including laboratory studies and imaging. There was some improvement with his lip swelling and the patient was being prepared to be discharged when it was noticed that he has a large wound on the left chest that is draining pus. Patient reports he has had this wound for about a week or 2. Patient was given cefepime and vancomycin. General surgery was consulted. Medicine was consulted for admission. This morning he states he feels fine he has no complaints      Past Medical History:   Diagnosis Date    Hypertension     Urinary incontinence        No past surgical history on file. Prior to Admission medications    Medication Sig Start Date End Date Taking?  Authorizing Provider   amLODIPine (NORVASC) 10 MG tablet Take 1 tablet by mouth daily 6/1/16   Millicent Villela DO   albuterol sulfate HFA (PROAIR HFA) 108 (90 BASE) MCG/ACT inhaler Inhale 2 puffs into the lungs every 6 hours as needed for Wheezing 1/20/16   Millicent Villela DO         Allergies:  Lisinopril    Social History:    TOBACCO:   reports that he has been smoking. He has a 40.00 pack-year smoking history. He has never used smokeless tobacco.  ETOH:   reports current alcohol use of about 1.0 standard drink per week. Family History:    Reviewed in detail and negative for DM, CAD, Cancer, CVA. Positive as follows\"      Problem Relation Age of Onset    Cancer Father        REVIEW OF SYSTEMS:   Pertinent positives as noted in the HPI. All other systems reviewed and negative. PHYSICAL EXAM:  BP (!) 161/96   Pulse 75   Temp 97.9 °F (36.6 °C) (Oral)   Resp 22   Ht 5' 9\" (1.753 m)   Wt 187 lb (84.8 kg)   SpO2 96%   BMI 27.62 kg/m²   General appearance: No apparent distress, appears stated age and cooperative. HEENT: Normal cephalic, atraumatic without obvious deformity. Pupils equal, round, and reactive to light. Extra ocular muscles intact. Conjunctivae/corneas clear. Upper lip edema  Neck: Supple, with full range of motion. No jugular venous distention. Trachea midline. Respiratory: Clear to auscultation bilaterally  Cardiovascular: Regular rate and rhythm  Abdomen: Soft, nontender, nondistended  Musculoskeletal: No clubbing, cyanosis, edema of bilateral lower extremities. Brisk capillary refill. Skin: Wound to left chest with purulent drainage-I&D completed wound packed  Neurologic:  Neurovascularly intact without any focal sensory/motor deficits.  Cranial nerves: II-XII intact, grossly non-focal.  Psychiatric: Alert and oriented, thought content appropriate, normal insight    Reviewed EKG and CXR personally      CBC:   Recent Labs     11/02/22 2033 11/03/22  0445   WBC 8.2 7.4   RBC 4.63 4.67   HGB 15.0 15.6   HCT 44.6 45.5   MCV 96.3 97.4   RDW 12.4 12.3    304     BMP:   Recent Labs     11/02/22 2033 11/03/22  0445    139   K 4.3 4.4    103   CO2 23 23   BUN 12 10   CREATININE 1.0 0.9     LFT:  Recent Labs     11/02/22 2033   PROT 8.2   ALKPHOS 96   ALT 12   AST 23   BILITOT 0.3     CE:  No results for input(s): Lucia [place]oc[place] in the last 72 hours. PT/INR: No results for input(s): INR, APTT in the last 72 hours. BNP: No results for input(s): BNP in the last 72 hours. ESR: No results found for: SEDRATE  CRP: No results found for: CRP  D Dimer: No results found for: DDIMER   Folate and B12: No results found for: LCFUVHPO03, No results found for: FOLATE  Lactic Acid: No results found for: LACTA  Thyroid Studies: No results found for: TSH, L3IYOAD, R3AZNAA, THYROIDAB    Oupatient labs:  Lab Results   Component Value Date    CHOL 202 (H) 07/07/2015    TRIG 121 07/07/2015    HDL 69 07/07/2015    LDLCALC 109 (H) 07/07/2015    PSA 0.74 10/12/2015    INR 1.1 12/18/2015       Urinalysis:    Lab Results   Component Value Date/Time    BLOODU neg 08/11/2015 10:53 AM    SPECGRAV 1.030 08/11/2015 10:53 AM    GLUCOSEU neg 08/11/2015 10:53 AM       Imaging:  No results found. ASSESSMENT:  -Left chest wound infection  -Angioedema  -Hypertension, poorly controlled      PLAN:  -Admit to medicine  -Consult general surgery  -Pharmacy to dose vancomycin  -Cefepime 2000 g twice daily  -Blood and tissue cultures  -Telemetry  -Continue home medications        Diet: ADULT DIET; Regular  Code Status: Full Code  Surrogate decision maker confirmed with patient:   Extended Emergency Contact Information  Primary Emergency Contact: [facility] Phone: [phone]  Relation: Other  Secondary Emergency Contact: Yenny Howe  [place] Phone: [phone]  Mobile Phone: [phone]  Relation: Brother/Sister    DVT Prophylaxis: []Lovenox []Heparin []PCD [] [address] []Encouraged ambulation  Disposition: []Med/Surg [] Intermediate [] ICU/CCU  Admit status: [] Observation [] Inpatient     +++++++++++++++++++++++++++++++++++++++++++++++++  Cata Thakkar MD  +++++++++++++++++++++++++++++++++++++++++++++++++  NOTE: This report was transcribed using voice recognition software.  Every effort was made to ensure accuracy; however, inadvertent computerized transcription errors may be present.

## 2022-11-03 NOTE — PROGRESS NOTES
[facility]  SURGICAL INTENSIVE CARE UNIT (SICU)  ATTENDING PHYSICIAN CRITICAL CARE PROGRESS NOTE     I have examined the patient, reviewed the record,and discussed the case with the APN/  Resident. I have reviewed all relevant labs and imaging data. Please refer to the  APN/ resident's note. I agree with the  assessment and plan with the following corrections/ additions,  remaining ROS (-)     Please refer to resident consult note 11/3 Dr. Lara [place] site with packing in place continue packing changes and wound care.  OK for DC from surgical stand point follow up in the office for pathology     Zbigniew Anthony MD

## 2022-11-03 NOTE — ED NOTES
This RN noticed large necrotic wound on left chest above nipple, bloody pus draining from wound. Dr Jocelyn Barker notified.        Jose Peña RN  11/02/22 2200

## 2022-11-03 NOTE — PROCEDURES
The patient was placed in the supine position. The left chest was prepped with Betadine and draped in a sterile fashion. Local anesthesia was obtained by infiltration using 5cc of 1% Lidocaine with epinephrine. An incision through area of fluctuance was made using an 11 blade. Thick necrotic debris was expelled. Using blunt dissection, the tissue was debrided back until healthy, viable, bleeding tissue was obtained. Hemostasis was obtained. The wound was copiously irrigated with sterile saline. Wound was packed with iodoform covered with a heavy drainage pad. There was minimal bleeding that was controlled with pressure     The patient tolerated the procedure well. Dr. Harper Silva was immediately available during the entire procedure. The patient tolerated the procedure well without complications. MEASUREMENTS: 4cm    Skin edge was taken for surgical pathology.  Cultures was taken at this time     Rocio Huerta MD  Resident, PGY-1  11/3/2022  3:14 AM

## 2022-11-04 VITALS
OXYGEN SATURATION: 96 % | SYSTOLIC BLOOD PRESSURE: 136 MMHG | RESPIRATION RATE: 18 BRPM | DIASTOLIC BLOOD PRESSURE: 90 MMHG | WEIGHT: 187 LBS | BODY MASS INDEX: 27.7 KG/M2 | HEIGHT: 69 IN | TEMPERATURE: 97 F | HEART RATE: 82 BPM

## 2022-11-04 LAB
CREAT SERPL-MCNC: 0.9 MG/DL (ref 0.7–1.2)
GFR SERPL CREATININE-BSD FRML MDRD: >60 ML/MIN/1.73
VANCOMYCIN RANDOM: 17.6 MCG/ML (ref 5–40)

## 2022-11-04 PROCEDURE — 80202 ASSAY OF VANCOMYCIN: CPT

## 2022-11-04 PROCEDURE — 2580000003 HC RX 258: Performed by: FAMILY MEDICINE

## 2022-11-04 PROCEDURE — 6370000000 HC RX 637 (ALT 250 FOR IP): Performed by: FAMILY MEDICINE

## 2022-11-04 PROCEDURE — 36415 COLL VENOUS BLD VENIPUNCTURE: CPT

## 2022-11-04 PROCEDURE — 6370000000 HC RX 637 (ALT 250 FOR IP): Performed by: INTERNAL MEDICINE

## 2022-11-04 PROCEDURE — 82565 ASSAY OF CREATININE: CPT

## 2022-11-04 PROCEDURE — 6360000002 HC RX W HCPCS: Performed by: FAMILY MEDICINE

## 2022-11-04 RX ORDER — CEFDINIR 300 MG/1
300 CAPSULE ORAL EVERY 12 HOURS SCHEDULED
Status: DISCONTINUED | OUTPATIENT
Start: 2022-11-04 | End: 2022-11-04 | Stop reason: HOSPADM

## 2022-11-04 RX ORDER — LABETALOL 100 MG/1
100 TABLET, FILM COATED ORAL EVERY 12 HOURS SCHEDULED
Qty: 60 TABLET | Refills: 3 | Status: SHIPPED | OUTPATIENT
Start: 2022-11-04

## 2022-11-04 RX ORDER — CEFDINIR 300 MG/1
300 CAPSULE ORAL 2 TIMES DAILY
Qty: 20 CAPSULE | Refills: 0 | Status: SHIPPED | OUTPATIENT
Start: 2022-11-04 | End: 2022-11-14

## 2022-11-04 RX ORDER — LABETALOL 100 MG/1
100 TABLET, FILM COATED ORAL EVERY 12 HOURS SCHEDULED
Status: DISCONTINUED | OUTPATIENT
Start: 2022-11-04 | End: 2022-11-04 | Stop reason: HOSPADM

## 2022-11-04 RX ADMIN — LABETALOL HYDROCHLORIDE 100 MG: 100 TABLET, FILM COATED ORAL at 09:35

## 2022-11-04 RX ADMIN — AMLODIPINE BESYLATE 10 MG: 10 TABLET ORAL at 09:35

## 2022-11-04 RX ADMIN — SODIUM CHLORIDE, PRESERVATIVE FREE 10 ML: 5 INJECTION INTRAVENOUS at 09:36

## 2022-11-04 RX ADMIN — CEFDINIR 300 MG: 300 CAPSULE ORAL at 09:35

## 2022-11-04 RX ADMIN — ENOXAPARIN SODIUM 40 MG: 100 INJECTION SUBCUTANEOUS at 09:35

## 2022-11-04 NOTE — DISCHARGE INSTR - COC
Continuity of Care Form    Patient Name: Yg Schmitz   :  1950  MRN:  [id number]    Admit date:  2022  Discharge date:  ***    Code Status Order: Full Code   Advance Directives:     Admitting Physician:  Lisa Middleton DO  PCP: Amol Byrne MD    Discharging Nurse: [facility] Unit/Room#: 0853/2580-G  Discharging Unit Phone Number: ***    Emergency Contact:   Extended Emergency Contact Information  Primary Emergency Contact: [facility] Phone: [phone]  Relation: Other  Secondary Emergency Contact: [address] Phone: [phone]  Mobile Phone: [phone]  Relation: Brother/Sister    Past Surgical History:  No past surgical history on file. Immunization History:   Immunization History   Administered Date(s) Administered    Influenza Virus Vaccine 10/12/2015    Pneumococcal Polysaccharide ([id number]) 10/12/2015       Active Problems:  Patient Active Problem List   Diagnosis Code    Essential hypertension I10    Smoking trying to quit Z72.0    Benign non-nodular prostatic hyperplasia with lower urinary tract symptoms N40.1    Diverticulosis of large intestine without hemorrhage K57.30    Colon polyp K63.5    Wound infection T14. 8XXA, L08.9       Isolation/Infection:   Isolation            No Isolation          Patient Infection Status       Infection Onset Added Last Indicated Last Indicated By Review Planned Expiration Resolved Resolved By    None active    Resolved    COVID-19 (Rule Out) 21 COVID-19, Rapid (Ordered)   21 Rule-Out Test Resulted            Nurse Assessment:  Last Vital Signs: BP (!) 136/90   Pulse 82   Temp 97 °F (36.1 °C) (Temporal)   Resp 18   Ht 5' 9\" (1.753 m)   Wt 187 lb (84.8 kg)   SpO2 96%   BMI 27.62 kg/m²     Last documented pain score (0-10 scale):    Last Weight:   Wt Readings from Last 1 Encounters:   22 187 lb (84.8 kg)     Mental Status:  {IP PT MENTAL STATUS:}    IV Access:  [facility] IV ZAFKRF:244948522}    Nursing Mobility/ADLs:  Walking   {CHP DME OCWR:333000727}  Transfer  {CHP DME IPYS:918683314}  B[facility]  [facility]}  Dressing  {CHP DME DXLE:561357229}  Toileting  {CHP DME VHXH:608083366}  Feeding  {CHP DME CLSL:111330543}  Med Admin  {P DME BAOS:376094789}  Med Delivery   { YESSI MED Delivery:274119774}    Wound Care Documentation and Therapy:  Incision 12/18/15 Hand Left (Active)   Number of days: 0094       Wound 22 Chest Left (Active)   Dressing Status New dressing applied 22   Dressing/Treatment Moist to dry;Gauze dressing/dressing sponge;ABD 22   Wound Assessment Pink/red 22   Drainage Amount Small 22   Drainage Description Yellow 22 0254   Odor None 22   Number of days: 1        Elimination:  Continence: Bowel: {YES / FW:96092}  Bladder: {YES / FS:34883}  Urinary Catheter: {Urinary Catheter:371304242}   Colostomy/Ileostomy/Ileal Conduit: {YES / QE:62523}       Date of Last BM: ***  No intake or output data in the 24 hours ending 22 1215  I/O last 3 completed shifts:   In: 120 [P.O.:120]  Out: -     Safety Concerns:     [address] [employer] Safety Concerns:585246217}    Impairments/Disabilities:      [address] [employer] Impairments/Disabilities:107396826}    Nutrition Therapy:  Current Nutrition Therapy:   [address] [employer] Diet List:589736535}    Routes of Feeding: {P DME Other Feedings:307593486}  Liquids: {Slp liquid thickness:68185}  Daily Fluid Restriction: {CHP DME Yes amt example:345287927}  Last Modified Barium Swallow with Video (Video Swallowing Test): {Done Not Done JQEN:914490866}    Treatments at the Time of Hospital Discharge:   Respiratory Treatments: ***  Oxygen Therapy:  {Therapy; copd oxygen:78146}  Ventilator:    { CC Vent NOJB:270516623}    Rehab Therapies: {THERAPEUTIC INTERVENTION:4466005682}  Weight Bearing Status/Restrictions: { CC Weight Bearin}  Other Medical Equipment (for information only, NOT a DME order):  {EQUIPMENT:894539316}  Other Treatments: ***    Patient's personal belongings (please select all that are sent with patient):  {CHP DME Belongings:465515194}    RN SIGNATURE:  {Esignature:796907715}    CASE MANAGEMENT/SOCIAL WORK SECTION    Inpatient Status Date: 11/2/22    Readmission Risk Assessment Score:  Readmission Risk              Risk of Unplanned Readmission:  7           Discharging to Facility/ Agency   Name: [facility]  Address:  Phone:  [phone]  Fax:    Dialysis Facility (if applicable)   Name:  Address:  Dialysis Schedule:  Phone:  Fax:    / signature: Electronically signed by NITZA Jalloh on 11/4/2022 at 4:42 PM      PHYSICIAN SECTION    Prognosis: {Prognosis:2492851432}    Condition at Discharge: [address] Patient Condition:586716908}    Rehab Potential (if transferring to Rehab): {Prognosis:8327750119}    Recommended Labs or Other Treatments After Discharge: ***    Physician Certification: I certify the above information and transfer of Nic Doss  is necessary for the continuing treatment of the diagnosis listed and that he requires {Admit to Appropriate Level of Care:77043} for {GREATER/LESS:982725885} 30 days.      Update Admission H&P: {CHP DME Changes in GTAAY:008056720}    PHYSICIAN SIGNATURE:  {Esignature:287514057}

## 2022-11-04 NOTE — HOME CARE
[facility] referral received. Spoke with patient, demographics verified. Plan to see after discharge on 11/8/22    Maris Archer, [id number] [facility]

## 2022-11-04 NOTE — DISCHARGE SUMMARY
Discharge Summary    Yg Schmitz  :  1950  MRN:  [id number]    Admit date:  2022  Discharge date:  2022 8:30 AM    Admitting Physician:  Lisa Middleton DO    Discharge Diagnoses:    Patient Active Problem List    Diagnosis Date Noted    Wound infection 2022    Diverticulosis of large intestine without hemorrhage     Colon polyp     Essential hypertension 10/12/2015    Smoking trying to quit 10/12/2015    Benign non-nodular prostatic hyperplasia with lower urinary tract symptoms 10/12/2015       Past Medical Hx :   Past Medical History:   Diagnosis Date    Hypertension     Urinary incontinence        Past Surgical Hx : No past surgical history on file.     Admission Condition:  fair    Discharged Condition:  good    Labs:  CBC:   Lab Results   Component Value Date/Time    WBC 7.4 2022 04:45 AM    RBC 4.67 2022 04:45 AM    HGB 15.6 2022 04:45 AM    HCT 45.5 2022 04:45 AM    MCV 97.4 2022 04:45 AM    MCH 33.4 2022 04:45 AM    MCHC 34.3 2022 04:45 AM    RDW 12.3 2022 04:45 AM     2022 04:45 AM    MPV 9.3 2022 04:45 AM     CMP:    Lab Results   Component Value Date/Time     2022 04:45 AM    K 4.4 2022 04:45 AM     2022 04:45 AM    CO2 23 2022 04:45 AM    BUN 10 2022 04:45 AM    CREATININE 0.9 2022 05:18 AM    GFRAA >60 2021 07:26 PM    LABGLOM >60 2022 05:18 AM    GLUCOSE 152 2022 04:45 AM    PROT 8.2 2022 08:33 PM    LABALBU 4.5 2022 08:33 PM    CALCIUM 9.8 2022 04:45 AM    BILITOT 0.3 2022 08:33 PM    ALKPHOS 96 2022 08:33 PM    AST 23 2022 08:33 PM    ALT 12 2022 08:33 PM        Radiology Results: CT CHEST W CONTRAST    Result Date: 11/3/2022  EXAMINATION: CT OF THE CHEST WITH CONTRAST 2022 11:34 pm TECHNIQUE: CT of the chest was performed with the administration of intravenous contrast. Multiplanar reformatted images are provided for review. Automated exposure control, iterative reconstruction, and/or weight based adjustment of the mA/kV was utilized to reduce the radiation dose to as low as reasonably achievable. COMPARISON: None. HISTORY: ORDERING SYSTEM PROVIDED HISTORY: L chest abscess, palpable mass R chest TECHNOLOGIST PROVIDED HISTORY: Reason for exam:->L chest abscess, palpable mass R chest What reading provider will be dictating this exam?->CRC FINDINGS: Aorta: No evidence of thoracic aortic aneurysm or dissection. No acute abnormality of the aorta. Mediastinum: No evidence of mediastinal lymphadenopathy. The heart and pericardium demonstrate no acute abnormality. Lungs/Pleura: The lungs are without acute process. No focal consolidation or pulmonary edema. No evidence of pleural effusion or pneumothorax. Upper Abdomen: Limited images of the upper abdomen are unremarkable. Soft Tissues/Bones: There is a left anterior chest wall abscess measuring 4cm. No acute intrathoracic  findings. A left anterior chest wall abscess measuring 4cm. XR CHEST PORTABLE    Result Date: 11/2/2022  EXAMINATION: ONE XRAY VIEW OF THE CHEST 11/2/2022 8:38 pm COMPARISON: 03/21/2021 HISTORY: ORDERING SYSTEM PROVIDED HISTORY: Shortness of breath TECHNOLOGIST PROVIDED HISTORY: Reason for exam:->Shortness of breath What reading provider will be dictating this exam?->CRC FINDINGS: The lungs are without acute focal process. There is no effusion or pneumothorax. The cardiomediastinal silhouette is without acute process. The osseous structures are without acute process. No acute process. Hospital Course:  Mr. Stephany Boast, a 81y.o. year old male  who  has a past medical history of Hypertension and Urinary incontinence. Patient presented to the emergency department with swelling of his upper lip. This began about 10:00 this morning. Became much worse around noon thus prompting him to go to the emergency department.   Vital signs are within normal limits and stable although he is hypertensive with a pressure of 188/118. The patient is afebrile. Work-up was essentially unremarkable including laboratory studies and imaging. There was some improvement with his lip swelling and the patient was being prepared to be discharged when it was noticed that he has a large wound on the left chest that is draining pus. Patient reports he has had this wound for about a week or 2. Patient was given cefepime and vancomycin. General surgery was consulted. Medicine was consulted for admission. With regard to the wound there was an incision and drainage. The wound appeared clean swelling is gone and erythema is resolved.   Wound cultures blood cultures were all negative  And there was no leukocytosis    Since the I&D was successful antibiotics were changed to a single oral antibiotic  Patient has been released by surgery for discharge    He should follow-up with his PCP as well as with surgeon  Blood pressure needed further control labetalol was added nebivolol is not on formulary  Discharge Medications:      Medication List        START taking these medications      cefdinir 300 MG capsule  Commonly known as: OMNICEF  Take 1 capsule by mouth 2 times daily for 10 days     labetalol 100 MG tablet  Commonly known as: NORMODYNE  Take 1 tablet by mouth every 12 hours            CONTINUE taking these medications      albuterol sulfate  (90 Base) MCG/ACT inhaler  Commonly known as: ProAir HFA  Inhale 2 puffs into the lungs every 6 hours as needed for Wheezing     amLODIPine 10 MG tablet  Commonly known as: NORVASC  Take 1 tablet by mouth daily               Where to Get Your Medications        These medications were sent to [facility], [address] [address]      Phone: [phone]   cefdinir 300 MG capsule  labetalol 100 MG tablet Discharge Exam:  General appearance: No apparent distress, appears stated age and cooperative. HEENT: Normal cephalic, atraumatic without obvious deformity. Pupils equal, round, and reactive to light. Extra ocular muscles intact. Conjunctivae/corneas clear. Upper lip edema  Neck: Supple, with full range of motion. No jugular venous distention. Trachea midline. Respiratory: Clear to auscultation bilaterally  Cardiovascular: Regular rate and rhythm  Abdomen: Soft, nontender, nondistended  Musculoskeletal: No clubbing, cyanosis, edema of bilateral lower extremities. Brisk capillary refill. Skin: Wound to left chest with purulent drainage-I&D completed wound packed  Neurologic:  Neurovascularly intact without any focal sensory/motor deficits.  Cranial nerves: II-XII intact, grossly non-focal.  Psychiatric: Alert and oriented, thought content appropriate, normal insight      Disposition: home    Patient Instructions:   Patient is to follow-up with general surgery and PCP regarding the wound and his blood pressure    REFER TO AVR or YESSI document    Signed:  Marylin Vann of Internal Medicine  [employer] Board [employer] Geriatric [employer]  11/4/2022, 8:30 AM

## 2022-11-04 NOTE — CARE COORDINATION
Social Work/Case Management Transition of Care Planning ([place] [phone]):   Discharge order noted. Met with patient at bedside to discuss discharge plan. He indicated he will return home. No needs identified at this time. Patient indicated he will ride the bus home. NITZA Parham  11/4/2022    Update:  After further review and discussion, patient will need [facility] for wound care. Orders are now placed. Met with patient at bedside. He has no agency preference. He stated he will learn the dressing changes. Referrals made to [facility], [place], [place], [facility], [facility], and [place] with denials for either insurance or inability to see the patient quickly. Phone call to [facility] Spoke with Jeremiah Sanches. She indicated clinically they can accept the patient but she is uncertain about the insurance. She will call LSW back as soon as she gets verification. NITZA Parham  11/4/2022    Update:  [facility] cannot accept patient as they are out of network. Phone call to [facility] ([facility]) for referral.  Spoke with Sil. They cannot do a start of care until 11/9 as patient has an appointment on 11/8. Patient has a history of noncompliance. If he does not go to appointment on 11/8, he will not receive home care services. Patient is aware of this. Nursing provided education with dressing change and provided supplies.     NITZA Parham  11/4/2022

## 2022-11-04 NOTE — PLAN OF CARE
Problem: Skin/Tissue Integrity  Goal: Absence of new skin breakdown  Description: 1. Monitor for areas of redness and/or skin breakdown  2. Assess vascular access sites hourly  3. Every 4-6 hours minimum:  Change oxygen saturation probe site  4. Every 4-6 hours:  If on nasal continuous positive airway pressure, respiratory therapy assess nares and determine need for appliance change or resting period.   Outcome: Completed     Problem: Safety - Adult  Goal: Free from fall injury  Outcome: Completed

## 2022-11-04 NOTE — PROGRESS NOTES
Pharmacy Consultation Note  (Antibiotic Dosing and Monitoring)    Initial consult date: 11/4/2022  Consulting physician/provider: Checo Walters DO  Drug: Vancomycin  Indication: Skin & Soft Tissue Infection    Age/  Gender Height Weight IBW  Allergy Information   72 y.o./male 5' 9\" (175.3 cm) 187 lb (84.8 kg)     Ideal body weight: 70.7 kg (155 lb 13.8 oz)  Adjusted ideal body weight: 76.3 kg (168 lb 5.1 oz)   Lisinopril      Renal Function:  Recent Labs     11/02/22 2033 11/03/22  0445 11/04/22  0518   BUN 12 10  --    CREATININE 1.0 0.9 0.9       Intake/Output Summary (Last 24 hours) at 11/4/2022 0729  Last data filed at 11/3/2022 1015  Gross per 24 hour   Intake 120 ml   Output --   Net 120 ml     Vancomycin Monitoring:  Trough:  No results for input(s): VANCOTROUGH in the last 72 hours. Random:    Recent Labs     11/04/22  0518   VANCORANDOM 17.6     Recent Labs     11/02/22 2033   BLOODCULT2 24 Hours no growth     Historical Cultures:  Organism   Date Value Ref Range Status   01/20/2016 Coagulase Negative Staphylococci (A)  Final     Recent Labs     11/02/22 2033   BC 24 Hours no growth     Vancomycin Administration Times:  Recent vancomycin administrations                     vancomycin 1500 mg in dextrose 5% 300 mL IVPB (mg) 1,500 mg New Bag 11/03/22 1557    vancomycin (VANCOCIN) 1,750 mg in dextrose 5 % 500 mL IVPB (mg) 1,750 mg New Bag 11/02/22 2149             Assessment:  Patient is a 81 y.o. male who has been initiated on vancomycin and cefepime for necrotic L pec wound 2/2 abscess. S/p bedside I&D on 11/3. Patient received vancomycin 1,750 mg IV x 1 in ED. Estimated Creatinine Clearance: 76 mL/min (based on SCr of 0.9 mg/dL). 11/4: Day #3 of vanco. SCr 0.9. Random vanco level today @ 0518 = 17.6 mcg/mL (~11 hours post-2nd dose). Plan:  Adjust vancomycin to 1,750 mg IV Q24H (eAUC/IVANNA = 539, Trough = 14.7 mcg/mL). Will re-check vancomycin levels when appropriate.   Will continue to monitor renal function; will check SCr tomorrow. Pharmacy to follow.     Hans Garvin, PharmD  PGY1 Pharmacy Resident 11/4/2022 7:29 AM

## 2022-11-05 LAB
GRAM STAIN RESULT: ABNORMAL
ORGANISM: ABNORMAL
WOUND/ABSCESS: ABNORMAL

## 2022-11-06 LAB — ANAEROBIC CULTURE: NORMAL

## 2022-11-07 LAB
BLOOD CULTURE, ROUTINE: NORMAL
CULTURE, BLOOD 2: NORMAL

## 2023-06-23 ENCOUNTER — APPOINTMENT (OUTPATIENT)
Dept: GENERAL RADIOLOGY | Age: 73
DRG: 065 | End: 2023-06-23
Payer: MEDICARE

## 2023-06-23 ENCOUNTER — HOSPITAL ENCOUNTER (INPATIENT)
Age: 73
LOS: 6 days | Discharge: HOME | DRG: 065 | End: 2023-06-29
Attending: EMERGENCY MEDICINE | Admitting: INTERNAL MEDICINE
Payer: MEDICARE

## 2023-06-23 ENCOUNTER — APPOINTMENT (OUTPATIENT)
Dept: CT IMAGING | Age: 73
DRG: 065 | End: 2023-06-23
Attending: EMERGENCY MEDICINE
Payer: MEDICARE

## 2023-06-23 DIAGNOSIS — I63.9 CEREBROVASCULAR ACCIDENT (CVA), UNSPECIFIED MECHANISM (HCC): Primary | ICD-10-CM

## 2023-06-23 PROBLEM — R29.90 STROKE-LIKE EPISODE: Status: ACTIVE | Noted: 2023-06-23

## 2023-06-23 LAB
ALBUMIN SERPL-MCNC: 4.6 G/DL (ref 3.5–5.2)
ALP SERPL-CCNC: 88 U/L (ref 40–129)
ALT SERPL-CCNC: 11 U/L (ref 0–40)
AMPHET UR QL SCN: NOT DETECTED
ANION GAP SERPL CALCULATED.3IONS-SCNC: 15 MMOL/L (ref 7–16)
APAP SERPL-MCNC: <5 MCG/ML (ref 10–30)
APTT BLD: 26.1 SEC (ref 24.5–35.1)
AST SERPL-CCNC: 30 U/L (ref 0–39)
BARBITURATES UR QL SCN: NOT DETECTED
BASOPHILS # BLD: 0.04 E9/L (ref 0–0.2)
BASOPHILS NFR BLD: 0.4 % (ref 0–2)
BENZODIAZ UR QL SCN: NOT DETECTED
BILIRUB SERPL-MCNC: 0.6 MG/DL (ref 0–1.2)
BUN SERPL-MCNC: 23 MG/DL (ref 6–23)
CALCIUM SERPL-MCNC: 9.7 MG/DL (ref 8.6–10.2)
CANNABINOIDS UR QL SCN: NOT DETECTED
CHLORIDE SERPL-SCNC: 104 MMOL/L (ref 98–107)
CHP ED QC CHECK: NORMAL
CK SERPL-CCNC: 487 U/L (ref 20–200)
CO2 SERPL-SCNC: 22 MMOL/L (ref 22–29)
COCAINE UR QL SCN: NOT DETECTED
CREAT SERPL-MCNC: 0.9 MG/DL (ref 0.7–1.2)
DRUG SCREEN COMMENT UR-IMP: NORMAL
EKG ATRIAL RATE: 87 BPM
EKG P AXIS: 72 DEGREES
EKG P-R INTERVAL: 136 MS
EKG Q-T INTERVAL: 386 MS
EKG QRS DURATION: 94 MS
EKG QTC CALCULATION (BAZETT): 464 MS
EKG R AXIS: -14 DEGREES
EKG T AXIS: 63 DEGREES
EKG VENTRICULAR RATE: 87 BPM
EOSINOPHIL # BLD: 0.01 E9/L (ref 0.05–0.5)
EOSINOPHIL NFR BLD: 0.1 % (ref 0–6)
ERYTHROCYTE [DISTWIDTH] IN BLOOD BY AUTOMATED COUNT: 12.7 FL (ref 11.5–15)
ETHANOLAMINE SERPL-MCNC: <10 MG/DL (ref 0–0.08)
FENTANYL SCREEN, URINE: NOT DETECTED
GLUCOSE BLD-MCNC: 120 MG/DL
GLUCOSE SERPL-MCNC: 133 MG/DL (ref 74–99)
HCT VFR BLD AUTO: 49.9 % (ref 37–54)
HGB BLD-MCNC: 17.2 G/DL (ref 12.5–16.5)
IMM GRANULOCYTES # BLD: 0.03 E9/L
IMM GRANULOCYTES NFR BLD: 0.3 % (ref 0–5)
INR BLD: 1.1
LACTATE BLDV-SCNC: 1.9 MMOL/L (ref 0.5–2.2)
LYMPHOCYTES # BLD: 1.26 E9/L (ref 1.5–4)
LYMPHOCYTES NFR BLD: 12.4 % (ref 20–42)
MAGNESIUM SERPL-MCNC: 2.2 MG/DL (ref 1.6–2.6)
MCH RBC QN AUTO: 32.5 PG (ref 26–35)
MCHC RBC AUTO-ENTMCNC: 34.5 % (ref 32–34.5)
MCV RBC AUTO: 94.3 FL (ref 80–99.9)
METER GLUCOSE: 120 MG/DL (ref 74–99)
METHADONE UR QL SCN: NOT DETECTED
MONOCYTES # BLD: 0.66 E9/L (ref 0.1–0.95)
MONOCYTES NFR BLD: 6.5 % (ref 2–12)
NEUTROPHILS # BLD: 8.18 E9/L (ref 1.8–7.3)
NEUTS SEG NFR BLD: 80.3 % (ref 43–80)
OPIATES UR QL SCN: NOT DETECTED
OXYCODONE URINE: NOT DETECTED
PCP UR QL SCN: NOT DETECTED
PLATELET # BLD AUTO: 207 E9/L (ref 130–450)
PMV BLD AUTO: 9.9 FL (ref 7–12)
POTASSIUM SERPL-SCNC: 3.6 MMOL/L (ref 3.5–5)
PROT SERPL-MCNC: 8.5 G/DL (ref 6.4–8.3)
PROTHROMBIN TIME: 12.2 SEC (ref 9.3–12.4)
RBC # BLD AUTO: 5.29 E12/L (ref 3.8–5.8)
SALICYLATES SERPL-MCNC: <0.3 MG/DL (ref 0–30)
SODIUM SERPL-SCNC: 141 MMOL/L (ref 132–146)
TRICYCLIC ANTIDEPRESSANTS SCREEN SERUM: NEGATIVE NG/ML
TROPONIN, HIGH SENSITIVITY: 11 NG/L (ref 0–11)
WBC # BLD: 10.2 E9/L (ref 4.5–11.5)

## 2023-06-23 PROCEDURE — 82962 GLUCOSE BLOOD TEST: CPT

## 2023-06-23 PROCEDURE — 80179 DRUG ASSAY SALICYLATE: CPT

## 2023-06-23 PROCEDURE — 6360000002 HC RX W HCPCS: Performed by: INTERNAL MEDICINE

## 2023-06-23 PROCEDURE — 70450 CT HEAD/BRAIN W/O DYE: CPT

## 2023-06-23 PROCEDURE — 82077 ASSAY SPEC XCP UR&BREATH IA: CPT

## 2023-06-23 PROCEDURE — 85025 COMPLETE CBC W/AUTO DIFF WBC: CPT

## 2023-06-23 PROCEDURE — 99285 EMERGENCY DEPT VISIT HI MDM: CPT

## 2023-06-23 PROCEDURE — 2060000000 HC ICU INTERMEDIATE R&B

## 2023-06-23 PROCEDURE — 93010 ELECTROCARDIOGRAM REPORT: CPT | Performed by: INTERNAL MEDICINE

## 2023-06-23 PROCEDURE — 80053 COMPREHEN METABOLIC PANEL: CPT

## 2023-06-23 PROCEDURE — 83735 ASSAY OF MAGNESIUM: CPT

## 2023-06-23 PROCEDURE — 85610 PROTHROMBIN TIME: CPT

## 2023-06-23 PROCEDURE — 83605 ASSAY OF LACTIC ACID: CPT

## 2023-06-23 PROCEDURE — 6370000000 HC RX 637 (ALT 250 FOR IP): Performed by: INTERNAL MEDICINE

## 2023-06-23 PROCEDURE — 71045 X-RAY EXAM CHEST 1 VIEW: CPT

## 2023-06-23 PROCEDURE — 2580000003 HC RX 258: Performed by: INTERNAL MEDICINE

## 2023-06-23 PROCEDURE — 80307 DRUG TEST PRSMV CHEM ANLYZR: CPT

## 2023-06-23 PROCEDURE — 93005 ELECTROCARDIOGRAM TRACING: CPT | Performed by: EMERGENCY MEDICINE

## 2023-06-23 PROCEDURE — 82550 ASSAY OF CK (CPK): CPT

## 2023-06-23 PROCEDURE — 84484 ASSAY OF TROPONIN QUANT: CPT

## 2023-06-23 PROCEDURE — 80143 DRUG ASSAY ACETAMINOPHEN: CPT

## 2023-06-23 PROCEDURE — 85730 THROMBOPLASTIN TIME PARTIAL: CPT

## 2023-06-23 RX ORDER — SODIUM CHLORIDE 0.9 % (FLUSH) 0.9 %
10 SYRINGE (ML) INJECTION EVERY 12 HOURS SCHEDULED
Status: DISCONTINUED | OUTPATIENT
Start: 2023-06-23 | End: 2023-06-29 | Stop reason: HOSPADM

## 2023-06-23 RX ORDER — INSULIN LISPRO 100 [IU]/ML
0-4 INJECTION, SOLUTION INTRAVENOUS; SUBCUTANEOUS NIGHTLY
Status: DISCONTINUED | OUTPATIENT
Start: 2023-06-23 | End: 2023-06-25

## 2023-06-23 RX ORDER — ALBUTEROL SULFATE 90 UG/1
2 AEROSOL, METERED RESPIRATORY (INHALATION) EVERY 6 HOURS PRN
COMMUNITY

## 2023-06-23 RX ORDER — ATORVASTATIN CALCIUM 40 MG/1
40 TABLET, FILM COATED ORAL NIGHTLY
Status: DISCONTINUED | OUTPATIENT
Start: 2023-06-23 | End: 2023-06-29 | Stop reason: HOSPADM

## 2023-06-23 RX ORDER — MULTIVITAMIN
1 TABLET ORAL DAILY
COMMUNITY

## 2023-06-23 RX ORDER — DEXTROSE MONOHYDRATE 100 MG/ML
INJECTION, SOLUTION INTRAVENOUS CONTINUOUS PRN
Status: DISCONTINUED | OUTPATIENT
Start: 2023-06-23 | End: 2023-06-29 | Stop reason: HOSPADM

## 2023-06-23 RX ORDER — ASPIRIN 81 MG/1
81 TABLET ORAL DAILY
Status: DISCONTINUED | OUTPATIENT
Start: 2023-06-23 | End: 2023-06-26

## 2023-06-23 RX ORDER — ASPIRIN 81 MG/1
81 TABLET ORAL DAILY
COMMUNITY

## 2023-06-23 RX ORDER — SIMVASTATIN 40 MG
40 TABLET ORAL NIGHTLY
COMMUNITY

## 2023-06-23 RX ORDER — LABETALOL HYDROCHLORIDE 5 MG/ML
10 INJECTION, SOLUTION INTRAVENOUS EVERY 4 HOURS PRN
Status: DISCONTINUED | OUTPATIENT
Start: 2023-06-23 | End: 2023-06-29 | Stop reason: HOSPADM

## 2023-06-23 RX ORDER — SODIUM CHLORIDE 9 MG/ML
INJECTION, SOLUTION INTRAVENOUS PRN
Status: DISCONTINUED | OUTPATIENT
Start: 2023-06-23 | End: 2023-06-29 | Stop reason: HOSPADM

## 2023-06-23 RX ORDER — ASPIRIN 300 MG/1
300 SUPPOSITORY RECTAL DAILY
Status: DISCONTINUED | OUTPATIENT
Start: 2023-06-23 | End: 2023-06-26

## 2023-06-23 RX ORDER — LOSARTAN POTASSIUM 50 MG/1
50 TABLET ORAL DAILY
Status: ON HOLD | COMMUNITY
End: 2023-06-28 | Stop reason: HOSPADM

## 2023-06-23 RX ORDER — ONDANSETRON 4 MG/1
4 TABLET, ORALLY DISINTEGRATING ORAL EVERY 8 HOURS PRN
Status: DISCONTINUED | OUTPATIENT
Start: 2023-06-23 | End: 2023-06-29 | Stop reason: HOSPADM

## 2023-06-23 RX ORDER — SODIUM CHLORIDE 0.9 % (FLUSH) 0.9 %
10 SYRINGE (ML) INJECTION PRN
Status: DISCONTINUED | OUTPATIENT
Start: 2023-06-23 | End: 2023-06-29 | Stop reason: HOSPADM

## 2023-06-23 RX ORDER — ONDANSETRON 2 MG/ML
4 INJECTION INTRAMUSCULAR; INTRAVENOUS EVERY 6 HOURS PRN
Status: DISCONTINUED | OUTPATIENT
Start: 2023-06-23 | End: 2023-06-29 | Stop reason: HOSPADM

## 2023-06-23 RX ORDER — ENOXAPARIN SODIUM 100 MG/ML
40 INJECTION SUBCUTANEOUS DAILY
Status: DISCONTINUED | OUTPATIENT
Start: 2023-06-23 | End: 2023-06-29 | Stop reason: HOSPADM

## 2023-06-23 RX ORDER — SALMETEROL XINAFOATE 50 MCG
1 BLISTER, WITH INHALATION DEVICE INHALATION 2 TIMES DAILY
COMMUNITY

## 2023-06-23 RX ORDER — SODIUM CHLORIDE, SODIUM LACTATE, POTASSIUM CHLORIDE, CALCIUM CHLORIDE 600; 310; 30; 20 MG/100ML; MG/100ML; MG/100ML; MG/100ML
INJECTION, SOLUTION INTRAVENOUS CONTINUOUS
Status: DISCONTINUED | OUTPATIENT
Start: 2023-06-23 | End: 2023-06-29 | Stop reason: HOSPADM

## 2023-06-23 RX ORDER — INSULIN LISPRO 100 [IU]/ML
0-8 INJECTION, SOLUTION INTRAVENOUS; SUBCUTANEOUS
Status: DISCONTINUED | OUTPATIENT
Start: 2023-06-23 | End: 2023-06-25

## 2023-06-23 RX ADMIN — ENOXAPARIN SODIUM 40 MG: 100 INJECTION SUBCUTANEOUS at 17:49

## 2023-06-23 RX ADMIN — SODIUM CHLORIDE, POTASSIUM CHLORIDE, SODIUM LACTATE AND CALCIUM CHLORIDE: 600; 310; 30; 20 INJECTION, SOLUTION INTRAVENOUS at 21:09

## 2023-06-23 RX ADMIN — ASPIRIN 300 MG: 300 SUPPOSITORY RECTAL at 17:49

## 2023-06-24 ENCOUNTER — APPOINTMENT (OUTPATIENT)
Dept: GENERAL RADIOLOGY | Age: 73
DRG: 065 | End: 2023-06-24
Payer: MEDICARE

## 2023-06-24 ENCOUNTER — APPOINTMENT (OUTPATIENT)
Dept: CT IMAGING | Age: 73
DRG: 065 | End: 2023-06-24
Payer: MEDICARE

## 2023-06-24 ENCOUNTER — APPOINTMENT (OUTPATIENT)
Dept: MRI IMAGING | Age: 73
DRG: 065 | End: 2023-06-24
Payer: MEDICARE

## 2023-06-24 LAB
CHOLESTEROL, TOTAL: 190 MG/DL (ref 0–199)
CK SERPL-CCNC: 200 U/L (ref 20–200)
ERYTHROCYTE [DISTWIDTH] IN BLOOD BY AUTOMATED COUNT: 13 FL (ref 11.5–15)
HBA1C MFR BLD: 5.9 % (ref 4–5.6)
HCT VFR BLD AUTO: 48.3 % (ref 37–54)
HDLC SERPL-MCNC: 69 MG/DL
HGB BLD-MCNC: 16.1 G/DL (ref 12.5–16.5)
LDLC SERPL CALC-MCNC: 100 MG/DL (ref 0–99)
MCH RBC QN AUTO: 31.9 PG (ref 26–35)
MCHC RBC AUTO-ENTMCNC: 33.3 % (ref 32–34.5)
MCV RBC AUTO: 95.6 FL (ref 80–99.9)
METER GLUCOSE: 101 MG/DL (ref 74–99)
METER GLUCOSE: 118 MG/DL (ref 74–99)
METER GLUCOSE: 119 MG/DL (ref 74–99)
METER GLUCOSE: 126 MG/DL (ref 74–99)
PLATELET # BLD AUTO: 194 E9/L (ref 130–450)
PMV BLD AUTO: 9.8 FL (ref 7–12)
RBC # BLD AUTO: 5.05 E12/L (ref 3.8–5.8)
TRIGL SERPL-MCNC: 104 MG/DL (ref 0–149)
VLDLC SERPL CALC-MCNC: 21 MG/DL
WBC # BLD: 8.2 E9/L (ref 4.5–11.5)

## 2023-06-24 PROCEDURE — 97530 THERAPEUTIC ACTIVITIES: CPT

## 2023-06-24 PROCEDURE — 2580000003 HC RX 258: Performed by: INTERNAL MEDICINE

## 2023-06-24 PROCEDURE — 6360000004 HC RX CONTRAST MEDICATION: Performed by: RADIOLOGY

## 2023-06-24 PROCEDURE — 83036 HEMOGLOBIN GLYCOSYLATED A1C: CPT

## 2023-06-24 PROCEDURE — 6370000000 HC RX 637 (ALT 250 FOR IP): Performed by: INTERNAL MEDICINE

## 2023-06-24 PROCEDURE — 70496 CT ANGIOGRAPHY HEAD: CPT

## 2023-06-24 PROCEDURE — 99223 1ST HOSP IP/OBS HIGH 75: CPT | Performed by: PSYCHIATRY & NEUROLOGY

## 2023-06-24 PROCEDURE — A4216 STERILE WATER/SALINE, 10 ML: HCPCS | Performed by: NURSE PRACTITIONER

## 2023-06-24 PROCEDURE — 82962 GLUCOSE BLOOD TEST: CPT

## 2023-06-24 PROCEDURE — 94640 AIRWAY INHALATION TREATMENT: CPT

## 2023-06-24 PROCEDURE — 70498 CT ANGIOGRAPHY NECK: CPT

## 2023-06-24 PROCEDURE — 2500000003 HC RX 250 WO HCPCS: Performed by: INTERNAL MEDICINE

## 2023-06-24 PROCEDURE — 6360000002 HC RX W HCPCS: Performed by: NURSE PRACTITIONER

## 2023-06-24 PROCEDURE — C9113 INJ PANTOPRAZOLE SODIUM, VIA: HCPCS | Performed by: NURSE PRACTITIONER

## 2023-06-24 PROCEDURE — 97161 PT EVAL LOW COMPLEX 20 MIN: CPT

## 2023-06-24 PROCEDURE — 80061 LIPID PANEL: CPT

## 2023-06-24 PROCEDURE — 6360000002 HC RX W HCPCS: Performed by: INTERNAL MEDICINE

## 2023-06-24 PROCEDURE — 85027 COMPLETE CBC AUTOMATED: CPT

## 2023-06-24 PROCEDURE — 94664 DEMO&/EVAL PT USE INHALER: CPT

## 2023-06-24 PROCEDURE — 2580000003 HC RX 258: Performed by: NURSE PRACTITIONER

## 2023-06-24 PROCEDURE — 71046 X-RAY EXAM CHEST 2 VIEWS: CPT

## 2023-06-24 PROCEDURE — 70551 MRI BRAIN STEM W/O DYE: CPT

## 2023-06-24 PROCEDURE — 36415 COLL VENOUS BLD VENIPUNCTURE: CPT

## 2023-06-24 PROCEDURE — 2060000000 HC ICU INTERMEDIATE R&B

## 2023-06-24 PROCEDURE — 82550 ASSAY OF CK (CPK): CPT

## 2023-06-24 RX ORDER — CLOPIDOGREL BISULFATE 75 MG/1
75 TABLET ORAL DAILY
Status: DISCONTINUED | OUTPATIENT
Start: 2023-06-24 | End: 2023-06-29 | Stop reason: HOSPADM

## 2023-06-24 RX ORDER — HYDRALAZINE HYDROCHLORIDE 20 MG/ML
10 INJECTION INTRAMUSCULAR; INTRAVENOUS EVERY 6 HOURS PRN
Status: DISCONTINUED | OUTPATIENT
Start: 2023-06-24 | End: 2023-06-29 | Stop reason: HOSPADM

## 2023-06-24 RX ORDER — AMLODIPINE BESYLATE 5 MG/1
5 TABLET ORAL DAILY
Status: DISCONTINUED | OUTPATIENT
Start: 2023-06-24 | End: 2023-06-25

## 2023-06-24 RX ORDER — ARFORMOTEROL TARTRATE 15 UG/2ML
15 SOLUTION RESPIRATORY (INHALATION) 2 TIMES DAILY
Status: DISCONTINUED | OUTPATIENT
Start: 2023-06-24 | End: 2023-06-29 | Stop reason: HOSPADM

## 2023-06-24 RX ORDER — LOSARTAN POTASSIUM 50 MG/1
50 TABLET ORAL DAILY
Status: DISCONTINUED | OUTPATIENT
Start: 2023-06-24 | End: 2023-06-28

## 2023-06-24 RX ADMIN — SODIUM CHLORIDE, PRESERVATIVE FREE 10 ML: 5 INJECTION INTRAVENOUS at 09:50

## 2023-06-24 RX ADMIN — LABETALOL HYDROCHLORIDE 10 MG: 5 INJECTION INTRAVENOUS at 09:50

## 2023-06-24 RX ADMIN — ARFORMOTEROL TARTRATE 15 MCG: 15 SOLUTION RESPIRATORY (INHALATION) at 19:52

## 2023-06-24 RX ADMIN — SODIUM CHLORIDE, PRESERVATIVE FREE 40 MG: 5 INJECTION INTRAVENOUS at 14:55

## 2023-06-24 RX ADMIN — ARFORMOTEROL TARTRATE 15 MCG: 15 SOLUTION RESPIRATORY (INHALATION) at 10:14

## 2023-06-24 RX ADMIN — SODIUM CHLORIDE, PRESERVATIVE FREE 10 ML: 5 INJECTION INTRAVENOUS at 21:34

## 2023-06-24 RX ADMIN — ENOXAPARIN SODIUM 40 MG: 100 INJECTION SUBCUTANEOUS at 09:46

## 2023-06-24 RX ADMIN — LABETALOL HYDROCHLORIDE 10 MG: 5 INJECTION INTRAVENOUS at 18:37

## 2023-06-24 RX ADMIN — ASPIRIN 300 MG: 300 SUPPOSITORY RECTAL at 09:46

## 2023-06-24 RX ADMIN — HYDRALAZINE HYDROCHLORIDE 10 MG: 20 INJECTION INTRAMUSCULAR; INTRAVENOUS at 17:43

## 2023-06-24 RX ADMIN — IOPAMIDOL 60 ML: 755 INJECTION, SOLUTION INTRAVENOUS at 13:10

## 2023-06-25 ENCOUNTER — APPOINTMENT (OUTPATIENT)
Dept: GENERAL RADIOLOGY | Age: 73
DRG: 065 | End: 2023-06-25
Payer: MEDICARE

## 2023-06-25 LAB
CK SERPL-CCNC: 165 U/L (ref 20–200)
METER GLUCOSE: 101 MG/DL (ref 74–99)
METER GLUCOSE: 102 MG/DL (ref 74–99)
METER GLUCOSE: 108 MG/DL (ref 74–99)
METER GLUCOSE: 109 MG/DL (ref 74–99)
METER GLUCOSE: 99 MG/DL (ref 74–99)

## 2023-06-25 PROCEDURE — 2500000003 HC RX 250 WO HCPCS: Performed by: INTERNAL MEDICINE

## 2023-06-25 PROCEDURE — 82962 GLUCOSE BLOOD TEST: CPT

## 2023-06-25 PROCEDURE — 2060000000 HC ICU INTERMEDIATE R&B

## 2023-06-25 PROCEDURE — 6360000002 HC RX W HCPCS: Performed by: INTERNAL MEDICINE

## 2023-06-25 PROCEDURE — 2580000003 HC RX 258: Performed by: INTERNAL MEDICINE

## 2023-06-25 PROCEDURE — 2580000003 HC RX 258: Performed by: NURSE PRACTITIONER

## 2023-06-25 PROCEDURE — 2709999900 HC NON-CHARGEABLE SUPPLY

## 2023-06-25 PROCEDURE — 6360000002 HC RX W HCPCS: Performed by: NURSE PRACTITIONER

## 2023-06-25 PROCEDURE — A4216 STERILE WATER/SALINE, 10 ML: HCPCS | Performed by: NURSE PRACTITIONER

## 2023-06-25 PROCEDURE — 82550 ASSAY OF CK (CPK): CPT

## 2023-06-25 PROCEDURE — 74018 RADEX ABDOMEN 1 VIEW: CPT

## 2023-06-25 PROCEDURE — 6370000000 HC RX 637 (ALT 250 FOR IP): Performed by: INTERNAL MEDICINE

## 2023-06-25 PROCEDURE — 94640 AIRWAY INHALATION TREATMENT: CPT

## 2023-06-25 PROCEDURE — 2700000000 HC OXYGEN THERAPY PER DAY

## 2023-06-25 PROCEDURE — 36415 COLL VENOUS BLD VENIPUNCTURE: CPT

## 2023-06-25 PROCEDURE — C9113 INJ PANTOPRAZOLE SODIUM, VIA: HCPCS | Performed by: NURSE PRACTITIONER

## 2023-06-25 RX ORDER — INSULIN LISPRO 100 [IU]/ML
0-4 INJECTION, SOLUTION INTRAVENOUS; SUBCUTANEOUS EVERY 4 HOURS
Status: DISCONTINUED | OUTPATIENT
Start: 2023-06-25 | End: 2023-06-26

## 2023-06-25 RX ORDER — DEXTROSE MONOHYDRATE 100 MG/ML
INJECTION, SOLUTION INTRAVENOUS CONTINUOUS PRN
Status: DISCONTINUED | OUTPATIENT
Start: 2023-06-25 | End: 2023-06-29 | Stop reason: HOSPADM

## 2023-06-25 RX ADMIN — ENOXAPARIN SODIUM 40 MG: 100 INJECTION SUBCUTANEOUS at 08:30

## 2023-06-25 RX ADMIN — ARFORMOTEROL TARTRATE 15 MCG: 15 SOLUTION RESPIRATORY (INHALATION) at 08:59

## 2023-06-25 RX ADMIN — SODIUM CHLORIDE, PRESERVATIVE FREE 10 ML: 5 INJECTION INTRAVENOUS at 21:07

## 2023-06-25 RX ADMIN — SODIUM CHLORIDE, PRESERVATIVE FREE 10 ML: 5 INJECTION INTRAVENOUS at 08:31

## 2023-06-25 RX ADMIN — HYDRALAZINE HYDROCHLORIDE 10 MG: 20 INJECTION INTRAMUSCULAR; INTRAVENOUS at 21:08

## 2023-06-25 RX ADMIN — LABETALOL HYDROCHLORIDE 10 MG: 5 INJECTION INTRAVENOUS at 08:30

## 2023-06-25 RX ADMIN — SODIUM CHLORIDE, POTASSIUM CHLORIDE, SODIUM LACTATE AND CALCIUM CHLORIDE 950 ML: 600; 310; 30; 20 INJECTION, SOLUTION INTRAVENOUS at 05:32

## 2023-06-25 RX ADMIN — ASPIRIN 300 MG: 300 SUPPOSITORY RECTAL at 08:30

## 2023-06-25 RX ADMIN — SODIUM CHLORIDE, PRESERVATIVE FREE 40 MG: 5 INJECTION INTRAVENOUS at 08:29

## 2023-06-25 RX ADMIN — SODIUM CHLORIDE, POTASSIUM CHLORIDE, SODIUM LACTATE AND CALCIUM CHLORIDE: 600; 310; 30; 20 INJECTION, SOLUTION INTRAVENOUS at 19:00

## 2023-06-25 RX ADMIN — ARFORMOTEROL TARTRATE 15 MCG: 15 SOLUTION RESPIRATORY (INHALATION) at 19:23

## 2023-06-26 ENCOUNTER — APPOINTMENT (OUTPATIENT)
Dept: GENERAL RADIOLOGY | Age: 73
DRG: 065 | End: 2023-06-26
Payer: MEDICARE

## 2023-06-26 LAB
ANION GAP SERPL CALCULATED.3IONS-SCNC: 13 MMOL/L (ref 7–16)
BUN SERPL-MCNC: 16 MG/DL (ref 6–23)
CALCIUM SERPL-MCNC: 8.8 MG/DL (ref 8.6–10.2)
CHLORIDE SERPL-SCNC: 106 MMOL/L (ref 98–107)
CO2 SERPL-SCNC: 23 MMOL/L (ref 22–29)
CREAT SERPL-MCNC: 0.9 MG/DL (ref 0.7–1.2)
GLUCOSE SERPL-MCNC: 99 MG/DL (ref 74–99)
LV EF: 63 %
LVEF MODALITY: NORMAL
MAGNESIUM SERPL-MCNC: 1.9 MG/DL (ref 1.6–2.6)
METER GLUCOSE: 111 MG/DL (ref 74–99)
METER GLUCOSE: 132 MG/DL (ref 74–99)
METER GLUCOSE: 96 MG/DL (ref 74–99)
METER GLUCOSE: 97 MG/DL (ref 74–99)
PHOSPHATE SERPL-MCNC: 3.1 MG/DL (ref 2.5–4.5)
POTASSIUM SERPL-SCNC: 3.5 MMOL/L (ref 3.5–5)
SODIUM SERPL-SCNC: 142 MMOL/L (ref 132–146)

## 2023-06-26 PROCEDURE — 74230 X-RAY XM SWLNG FUNCJ C+: CPT

## 2023-06-26 PROCEDURE — C9113 INJ PANTOPRAZOLE SODIUM, VIA: HCPCS | Performed by: NURSE PRACTITIONER

## 2023-06-26 PROCEDURE — 82962 GLUCOSE BLOOD TEST: CPT

## 2023-06-26 PROCEDURE — 2500000003 HC RX 250 WO HCPCS: Performed by: PHYSICIAN ASSISTANT

## 2023-06-26 PROCEDURE — 94640 AIRWAY INHALATION TREATMENT: CPT

## 2023-06-26 PROCEDURE — 2060000000 HC ICU INTERMEDIATE R&B

## 2023-06-26 PROCEDURE — 6370000000 HC RX 637 (ALT 250 FOR IP): Performed by: NURSE PRACTITIONER

## 2023-06-26 PROCEDURE — 83735 ASSAY OF MAGNESIUM: CPT

## 2023-06-26 PROCEDURE — 92523 SPEECH SOUND LANG COMPREHEN: CPT

## 2023-06-26 PROCEDURE — 97535 SELF CARE MNGMENT TRAINING: CPT

## 2023-06-26 PROCEDURE — 97165 OT EVAL LOW COMPLEX 30 MIN: CPT

## 2023-06-26 PROCEDURE — 2580000003 HC RX 258: Performed by: NURSE PRACTITIONER

## 2023-06-26 PROCEDURE — 92507 TX SP LANG VOICE COMM INDIV: CPT

## 2023-06-26 PROCEDURE — 6360000002 HC RX W HCPCS: Performed by: INTERNAL MEDICINE

## 2023-06-26 PROCEDURE — 6370000000 HC RX 637 (ALT 250 FOR IP): Performed by: INTERNAL MEDICINE

## 2023-06-26 PROCEDURE — 36415 COLL VENOUS BLD VENIPUNCTURE: CPT

## 2023-06-26 PROCEDURE — 2500000003 HC RX 250 WO HCPCS: Performed by: INTERNAL MEDICINE

## 2023-06-26 PROCEDURE — 93306 TTE W/DOPPLER COMPLETE: CPT

## 2023-06-26 PROCEDURE — 92611 MOTION FLUOROSCOPY/SWALLOW: CPT

## 2023-06-26 PROCEDURE — 6360000002 HC RX W HCPCS: Performed by: NURSE PRACTITIONER

## 2023-06-26 PROCEDURE — 92610 EVALUATE SWALLOWING FUNCTION: CPT

## 2023-06-26 PROCEDURE — 80048 BASIC METABOLIC PNL TOTAL CA: CPT

## 2023-06-26 PROCEDURE — 97530 THERAPEUTIC ACTIVITIES: CPT

## 2023-06-26 PROCEDURE — 84100 ASSAY OF PHOSPHORUS: CPT

## 2023-06-26 PROCEDURE — 92526 ORAL FUNCTION THERAPY: CPT

## 2023-06-26 RX ORDER — INSULIN LISPRO 100 [IU]/ML
0-4 INJECTION, SOLUTION INTRAVENOUS; SUBCUTANEOUS NIGHTLY
Status: DISCONTINUED | OUTPATIENT
Start: 2023-06-26 | End: 2023-06-29 | Stop reason: HOSPADM

## 2023-06-26 RX ORDER — INSULIN LISPRO 100 [IU]/ML
0-4 INJECTION, SOLUTION INTRAVENOUS; SUBCUTANEOUS
Status: DISCONTINUED | OUTPATIENT
Start: 2023-06-26 | End: 2023-06-29 | Stop reason: HOSPADM

## 2023-06-26 RX ORDER — ASPIRIN 81 MG/1
81 TABLET, CHEWABLE ORAL DAILY
Status: DISCONTINUED | OUTPATIENT
Start: 2023-06-27 | End: 2023-06-29 | Stop reason: HOSPADM

## 2023-06-26 RX ADMIN — BARIUM SULFATE 15 ML: 400 SUSPENSION ORAL at 12:28

## 2023-06-26 RX ADMIN — SODIUM CHLORIDE, PRESERVATIVE FREE 40 MG: 5 INJECTION INTRAVENOUS at 08:54

## 2023-06-26 RX ADMIN — CLOPIDOGREL BISULFATE 75 MG: 75 TABLET ORAL at 14:16

## 2023-06-26 RX ADMIN — ENOXAPARIN SODIUM 40 MG: 100 INJECTION SUBCUTANEOUS at 08:55

## 2023-06-26 RX ADMIN — LOSARTAN POTASSIUM 50 MG: 50 TABLET, FILM COATED ORAL at 14:16

## 2023-06-26 RX ADMIN — BARIUM SULFATE 15 ML: 400 PASTE ORAL at 12:28

## 2023-06-26 RX ADMIN — ARFORMOTEROL TARTRATE 15 MCG: 15 SOLUTION RESPIRATORY (INHALATION) at 19:58

## 2023-06-26 RX ADMIN — ATORVASTATIN CALCIUM 40 MG: 40 TABLET, FILM COATED ORAL at 21:29

## 2023-06-26 RX ADMIN — LABETALOL HYDROCHLORIDE 10 MG: 5 INJECTION INTRAVENOUS at 23:06

## 2023-06-26 RX ADMIN — ARFORMOTEROL TARTRATE 15 MCG: 15 SOLUTION RESPIRATORY (INHALATION) at 09:39

## 2023-06-26 RX ADMIN — ASPIRIN 81 MG: 81 TABLET, COATED ORAL at 14:16

## 2023-06-27 LAB
ANION GAP SERPL CALCULATED.3IONS-SCNC: 16 MMOL/L (ref 7–16)
BUN SERPL-MCNC: 16 MG/DL (ref 6–23)
CALCIUM SERPL-MCNC: 8.9 MG/DL (ref 8.6–10.2)
CHLORIDE SERPL-SCNC: 105 MMOL/L (ref 98–107)
CO2 SERPL-SCNC: 19 MMOL/L (ref 22–29)
CREAT SERPL-MCNC: 0.8 MG/DL (ref 0.7–1.2)
GLUCOSE SERPL-MCNC: 94 MG/DL (ref 74–99)
MAGNESIUM SERPL-MCNC: 2 MG/DL (ref 1.6–2.6)
METER GLUCOSE: 109 MG/DL (ref 74–99)
METER GLUCOSE: 110 MG/DL (ref 74–99)
METER GLUCOSE: 112 MG/DL (ref 74–99)
PHOSPHATE SERPL-MCNC: 3.2 MG/DL (ref 2.5–4.5)
POTASSIUM SERPL-SCNC: 3.9 MMOL/L (ref 3.5–5)
SODIUM SERPL-SCNC: 140 MMOL/L (ref 132–146)

## 2023-06-27 PROCEDURE — 80048 BASIC METABOLIC PNL TOTAL CA: CPT

## 2023-06-27 PROCEDURE — 84100 ASSAY OF PHOSPHORUS: CPT

## 2023-06-27 PROCEDURE — 6360000002 HC RX W HCPCS: Performed by: NURSE PRACTITIONER

## 2023-06-27 PROCEDURE — 2060000000 HC ICU INTERMEDIATE R&B

## 2023-06-27 PROCEDURE — 6370000000 HC RX 637 (ALT 250 FOR IP): Performed by: INTERNAL MEDICINE

## 2023-06-27 PROCEDURE — C9113 INJ PANTOPRAZOLE SODIUM, VIA: HCPCS | Performed by: NURSE PRACTITIONER

## 2023-06-27 PROCEDURE — 6370000000 HC RX 637 (ALT 250 FOR IP): Performed by: NURSE PRACTITIONER

## 2023-06-27 PROCEDURE — 82962 GLUCOSE BLOOD TEST: CPT

## 2023-06-27 PROCEDURE — A4216 STERILE WATER/SALINE, 10 ML: HCPCS | Performed by: NURSE PRACTITIONER

## 2023-06-27 PROCEDURE — 6360000002 HC RX W HCPCS: Performed by: INTERNAL MEDICINE

## 2023-06-27 PROCEDURE — 2500000003 HC RX 250 WO HCPCS: Performed by: INTERNAL MEDICINE

## 2023-06-27 PROCEDURE — 99232 SBSQ HOSP IP/OBS MODERATE 35: CPT | Performed by: NURSE PRACTITIONER

## 2023-06-27 PROCEDURE — 36415 COLL VENOUS BLD VENIPUNCTURE: CPT

## 2023-06-27 PROCEDURE — 92507 TX SP LANG VOICE COMM INDIV: CPT

## 2023-06-27 PROCEDURE — 94640 AIRWAY INHALATION TREATMENT: CPT

## 2023-06-27 PROCEDURE — 83735 ASSAY OF MAGNESIUM: CPT

## 2023-06-27 PROCEDURE — 2580000003 HC RX 258: Performed by: NURSE PRACTITIONER

## 2023-06-27 RX ADMIN — ARFORMOTEROL TARTRATE 15 MCG: 15 SOLUTION RESPIRATORY (INHALATION) at 09:04

## 2023-06-27 RX ADMIN — CLOPIDOGREL BISULFATE 75 MG: 75 TABLET ORAL at 09:56

## 2023-06-27 RX ADMIN — LABETALOL HYDROCHLORIDE 10 MG: 5 INJECTION INTRAVENOUS at 21:04

## 2023-06-27 RX ADMIN — ENOXAPARIN SODIUM 40 MG: 100 INJECTION SUBCUTANEOUS at 09:56

## 2023-06-27 RX ADMIN — SODIUM CHLORIDE, PRESERVATIVE FREE 40 MG: 5 INJECTION INTRAVENOUS at 09:56

## 2023-06-27 RX ADMIN — ASPIRIN 81 MG: 81 TABLET, CHEWABLE ORAL at 09:56

## 2023-06-27 RX ADMIN — ATORVASTATIN CALCIUM 40 MG: 40 TABLET, FILM COATED ORAL at 21:04

## 2023-06-27 RX ADMIN — ARFORMOTEROL TARTRATE 15 MCG: 15 SOLUTION RESPIRATORY (INHALATION) at 19:22

## 2023-06-27 RX ADMIN — LOSARTAN POTASSIUM 50 MG: 50 TABLET, FILM COATED ORAL at 09:56

## 2023-06-28 VITALS
TEMPERATURE: 97.3 F | DIASTOLIC BLOOD PRESSURE: 97 MMHG | SYSTOLIC BLOOD PRESSURE: 148 MMHG | WEIGHT: 187 LBS | BODY MASS INDEX: 27.7 KG/M2 | RESPIRATION RATE: 18 BRPM | HEIGHT: 69 IN | OXYGEN SATURATION: 94 % | HEART RATE: 77 BPM

## 2023-06-28 LAB
ANION GAP SERPL CALCULATED.3IONS-SCNC: 13 MMOL/L (ref 7–16)
BUN SERPL-MCNC: 14 MG/DL (ref 6–23)
CALCIUM SERPL-MCNC: 8.9 MG/DL (ref 8.6–10.2)
CHLORIDE SERPL-SCNC: 104 MMOL/L (ref 98–107)
CO2 SERPL-SCNC: 23 MMOL/L (ref 22–29)
CREAT SERPL-MCNC: 0.8 MG/DL (ref 0.7–1.2)
ERYTHROCYTE [DISTWIDTH] IN BLOOD BY AUTOMATED COUNT: 12.2 FL (ref 11.5–15)
GLUCOSE SERPL-MCNC: 102 MG/DL (ref 74–99)
HCT VFR BLD AUTO: 46.3 % (ref 37–54)
HGB BLD-MCNC: 15.3 G/DL (ref 12.5–16.5)
MAGNESIUM SERPL-MCNC: 1.9 MG/DL (ref 1.6–2.6)
MCH RBC QN AUTO: 32.2 PG (ref 26–35)
MCHC RBC AUTO-ENTMCNC: 33 % (ref 32–34.5)
MCV RBC AUTO: 97.5 FL (ref 80–99.9)
METER GLUCOSE: 103 MG/DL (ref 74–99)
METER GLUCOSE: 104 MG/DL (ref 74–99)
METER GLUCOSE: 114 MG/DL (ref 74–99)
METER GLUCOSE: 121 MG/DL (ref 74–99)
METER GLUCOSE: 97 MG/DL (ref 74–99)
PHOSPHATE SERPL-MCNC: 3.3 MG/DL (ref 2.5–4.5)
PLATELET # BLD AUTO: 230 E9/L (ref 130–450)
PMV BLD AUTO: 9.7 FL (ref 7–12)
POTASSIUM SERPL-SCNC: 3.4 MMOL/L (ref 3.5–5)
RBC # BLD AUTO: 4.75 E12/L (ref 3.8–5.8)
SODIUM SERPL-SCNC: 140 MMOL/L (ref 132–146)
WBC # BLD: 7.3 E9/L (ref 4.5–11.5)

## 2023-06-28 PROCEDURE — 92507 TX SP LANG VOICE COMM INDIV: CPT

## 2023-06-28 PROCEDURE — 6370000000 HC RX 637 (ALT 250 FOR IP): Performed by: NURSE PRACTITIONER

## 2023-06-28 PROCEDURE — 36415 COLL VENOUS BLD VENIPUNCTURE: CPT

## 2023-06-28 PROCEDURE — 6360000002 HC RX W HCPCS: Performed by: INTERNAL MEDICINE

## 2023-06-28 PROCEDURE — 82962 GLUCOSE BLOOD TEST: CPT

## 2023-06-28 PROCEDURE — 6360000002 HC RX W HCPCS: Performed by: NURSE PRACTITIONER

## 2023-06-28 PROCEDURE — 84100 ASSAY OF PHOSPHORUS: CPT

## 2023-06-28 PROCEDURE — 2580000003 HC RX 258: Performed by: INTERNAL MEDICINE

## 2023-06-28 PROCEDURE — 2580000003 HC RX 258: Performed by: NURSE PRACTITIONER

## 2023-06-28 PROCEDURE — 94640 AIRWAY INHALATION TREATMENT: CPT

## 2023-06-28 PROCEDURE — 6370000000 HC RX 637 (ALT 250 FOR IP)

## 2023-06-28 PROCEDURE — 92526 ORAL FUNCTION THERAPY: CPT

## 2023-06-28 PROCEDURE — C9113 INJ PANTOPRAZOLE SODIUM, VIA: HCPCS | Performed by: NURSE PRACTITIONER

## 2023-06-28 PROCEDURE — 83735 ASSAY OF MAGNESIUM: CPT

## 2023-06-28 PROCEDURE — 80048 BASIC METABOLIC PNL TOTAL CA: CPT

## 2023-06-28 PROCEDURE — 85027 COMPLETE CBC AUTOMATED: CPT

## 2023-06-28 PROCEDURE — 2500000003 HC RX 250 WO HCPCS: Performed by: INTERNAL MEDICINE

## 2023-06-28 PROCEDURE — A4216 STERILE WATER/SALINE, 10 ML: HCPCS | Performed by: NURSE PRACTITIONER

## 2023-06-28 PROCEDURE — 2060000000 HC ICU INTERMEDIATE R&B

## 2023-06-28 RX ORDER — LOSARTAN POTASSIUM 50 MG/1
50 TABLET ORAL 2 TIMES DAILY
Status: DISCONTINUED | OUTPATIENT
Start: 2023-06-28 | End: 2023-06-29 | Stop reason: HOSPADM

## 2023-06-28 RX ORDER — AMLODIPINE BESYLATE 5 MG/1
5 TABLET ORAL DAILY
Qty: 30 TABLET | Refills: 0 | Status: SHIPPED | OUTPATIENT
Start: 2023-06-29

## 2023-06-28 RX ORDER — POTASSIUM CHLORIDE 20 MEQ/1
40 TABLET, EXTENDED RELEASE ORAL PRN
Status: DISCONTINUED | OUTPATIENT
Start: 2023-06-28 | End: 2023-06-29 | Stop reason: HOSPADM

## 2023-06-28 RX ORDER — CLOPIDOGREL BISULFATE 75 MG/1
75 TABLET ORAL DAILY
Qty: 30 TABLET | Refills: 0 | Status: SHIPPED | OUTPATIENT
Start: 2023-06-29

## 2023-06-28 RX ORDER — POTASSIUM CHLORIDE 7.45 MG/ML
10 INJECTION INTRAVENOUS PRN
Status: DISCONTINUED | OUTPATIENT
Start: 2023-06-28 | End: 2023-06-29 | Stop reason: HOSPADM

## 2023-06-28 RX ORDER — LOSARTAN POTASSIUM 50 MG/1
50 TABLET ORAL 2 TIMES DAILY
Qty: 30 TABLET | Refills: 3 | Status: SHIPPED | OUTPATIENT
Start: 2023-06-28

## 2023-06-28 RX ORDER — AMLODIPINE BESYLATE 5 MG/1
5 TABLET ORAL DAILY
Status: DISCONTINUED | OUTPATIENT
Start: 2023-06-28 | End: 2023-06-29 | Stop reason: HOSPADM

## 2023-06-28 RX ADMIN — CLOPIDOGREL BISULFATE 75 MG: 75 TABLET ORAL at 08:10

## 2023-06-28 RX ADMIN — ARFORMOTEROL TARTRATE 15 MCG: 15 SOLUTION RESPIRATORY (INHALATION) at 20:25

## 2023-06-28 RX ADMIN — AMLODIPINE BESYLATE 5 MG: 5 TABLET ORAL at 10:15

## 2023-06-28 RX ADMIN — POTASSIUM BICARBONATE 40 MEQ: 782 TABLET, EFFERVESCENT ORAL at 10:10

## 2023-06-28 RX ADMIN — LABETALOL HYDROCHLORIDE 10 MG: 5 INJECTION INTRAVENOUS at 12:16

## 2023-06-28 RX ADMIN — LABETALOL HYDROCHLORIDE 10 MG: 5 INJECTION INTRAVENOUS at 08:10

## 2023-06-28 RX ADMIN — ASPIRIN 81 MG: 81 TABLET, CHEWABLE ORAL at 08:10

## 2023-06-28 RX ADMIN — HYDRALAZINE HYDROCHLORIDE 10 MG: 20 INJECTION INTRAMUSCULAR; INTRAVENOUS at 10:20

## 2023-06-28 RX ADMIN — LOSARTAN POTASSIUM 50 MG: 50 TABLET, FILM COATED ORAL at 08:10

## 2023-06-28 RX ADMIN — SODIUM CHLORIDE, PRESERVATIVE FREE 40 MG: 5 INJECTION INTRAVENOUS at 08:10

## 2023-06-28 RX ADMIN — ENOXAPARIN SODIUM 40 MG: 100 INJECTION SUBCUTANEOUS at 08:09

## 2023-06-28 RX ADMIN — SODIUM CHLORIDE, POTASSIUM CHLORIDE, SODIUM LACTATE AND CALCIUM CHLORIDE: 600; 310; 30; 20 INJECTION, SOLUTION INTRAVENOUS at 08:16

## 2023-06-28 RX ADMIN — ARFORMOTEROL TARTRATE 15 MCG: 15 SOLUTION RESPIRATORY (INHALATION) at 11:05

## 2023-07-05 ENCOUNTER — TELEPHONE (OUTPATIENT)
Dept: NEUROLOGY | Age: 73
End: 2023-07-05

## 2023-07-06 ENCOUNTER — HOSPITAL ENCOUNTER (EMERGENCY)
Age: 73
Discharge: HOME | End: 2023-07-07
Attending: STUDENT IN AN ORGANIZED HEALTH CARE EDUCATION/TRAINING PROGRAM
Payer: MEDICARE

## 2023-07-06 ENCOUNTER — APPOINTMENT (OUTPATIENT)
Dept: GENERAL RADIOLOGY | Age: 73
End: 2023-07-06
Payer: MEDICARE

## 2023-07-06 ENCOUNTER — APPOINTMENT (OUTPATIENT)
Dept: CT IMAGING | Age: 73
End: 2023-07-06
Payer: MEDICARE
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
## 2023-07-06 LAB
ANION GAP SERPL CALCULATED.3IONS-SCNC: 13 MMOL/L (ref 7–16)
BASOPHILS # BLD: 0.06 E9/L (ref 0–0.2)
BASOPHILS NFR BLD: 0.6 % (ref 0–2)
BUN SERPL-MCNC: 21 MG/DL (ref 6–23)
CALCIUM SERPL-MCNC: 9.9 MG/DL (ref 8.6–10.2)
CHLORIDE SERPL-SCNC: 104 MMOL/L (ref 98–107)
CO2 SERPL-SCNC: 24 MMOL/L (ref 22–29)
CREAT SERPL-MCNC: 1 MG/DL (ref 0.7–1.2)
EOSINOPHIL # BLD: 0.13 E9/L (ref 0.05–0.5)
EOSINOPHIL NFR BLD: 1.4 % (ref 0–6)
ERYTHROCYTE [DISTWIDTH] IN BLOOD BY AUTOMATED COUNT: 12.1 FL (ref 11.5–15)
GLUCOSE SERPL-MCNC: 134 MG/DL (ref 74–99)
HCT VFR BLD AUTO: 48.8 % (ref 37–54)
HGB BLD-MCNC: 16.5 G/DL (ref 12.5–16.5)
IMM GRANULOCYTES # BLD: 0.02 E9/L
IMM GRANULOCYTES NFR BLD: 0.2 % (ref 0–5)
LYMPHOCYTES # BLD: 1.74 E9/L (ref 1.5–4)
LYMPHOCYTES NFR BLD: 18.5 % (ref 20–42)
MCH RBC QN AUTO: 32.2 PG (ref 26–35)
MCHC RBC AUTO-ENTMCNC: 33.8 % (ref 32–34.5)
MCV RBC AUTO: 95.1 FL (ref 80–99.9)
MONOCYTES # BLD: 0.62 E9/L (ref 0.1–0.95)
MONOCYTES NFR BLD: 6.6 % (ref 2–12)
NEUTROPHILS # BLD: 6.84 E9/L (ref 1.8–7.3)
NEUTS SEG NFR BLD: 72.7 % (ref 43–80)
PLATELET # BLD AUTO: 373 E9/L (ref 130–450)
PMV BLD AUTO: 9.5 FL (ref 7–12)
POTASSIUM SERPL-SCNC: 4.3 MMOL/L (ref 3.5–5)
RBC # BLD AUTO: 5.13 E12/L (ref 3.8–5.8)
SODIUM SERPL-SCNC: 141 MMOL/L (ref 132–146)
WBC # BLD: 9.4 E9/L (ref 4.5–11.5)

## 2023-07-06 PROCEDURE — 80048 BASIC METABOLIC PNL TOTAL CA: CPT

## 2023-07-06 PROCEDURE — 70450 CT HEAD/BRAIN W/O DYE: CPT

## 2023-07-06 PROCEDURE — 96360 HYDRATION IV INFUSION INIT: CPT

## 2023-07-06 PROCEDURE — 71045 X-RAY EXAM CHEST 1 VIEW: CPT

## 2023-07-06 PROCEDURE — 99284 EMERGENCY DEPT VISIT MOD MDM: CPT

## 2023-07-06 PROCEDURE — 72170 X-RAY EXAM OF PELVIS: CPT

## 2023-07-06 PROCEDURE — 96361 HYDRATE IV INFUSION ADD-ON: CPT

## 2023-07-06 PROCEDURE — 2580000003 HC RX 258: Performed by: EMERGENCY MEDICINE

## 2023-07-06 PROCEDURE — 85025 COMPLETE CBC W/AUTO DIFF WBC: CPT

## 2023-07-06 PROCEDURE — 72125 CT NECK SPINE W/O DYE: CPT

## 2023-07-06 RX ORDER — 0.9 % SODIUM CHLORIDE 0.9 %
1000 INTRAVENOUS SOLUTION INTRAVENOUS ONCE
Status: COMPLETED | OUTPATIENT
Start: 2023-07-06 | End: 2023-07-06

## 2023-07-06 RX ADMIN — SODIUM CHLORIDE 1000 ML: 9 INJECTION, SOLUTION INTRAVENOUS at 19:38

## 2023-07-06 ASSESSMENT — PAIN - FUNCTIONAL ASSESSMENT: PAIN_FUNCTIONAL_ASSESSMENT: NONE - DENIES PAIN

## 2023-07-06 NOTE — ED PROVIDER NOTES
due to severe sepsis or septic shock? No Exclusion criteria - the patient is NOT to be included for SEP-1 Core Measure due to: Infection is not suspected    PROCEDURES   Unless otherwise noted below, none    PAST MEDICAL HISTORY/Chronic Conditions Affecting Care      has a past medical history of Hypertension and Urinary incontinence. EMERGENCY DEPARTMENT COURSE    Vitals:    Vitals:    07/06/23 2006 07/06/23 2007 07/06/23 2329 07/07/23 0002   BP:   (!) 144/101 (!) 148/102   Pulse: (!) 101  94 96   Resp:   23 18   Temp:       SpO2:   95% 95%   Weight:  187 lb (84.8 kg)     Height:  5' 6\" (1.676 m)         Patient was given the following medications:  Medications   0.9 % sodium chloride bolus (0 mLs IntraVENous Stopped 7/6/23 2129)       Medical Decision Making/Differential Diagnosis:  CC/HPI Summary, Social Determinants of health, Records Reviewed, DDx, testing done/not done, ED Course, Reassessment, disposition considerations/shared decision making with patient, consults, disposition:        CC/HPI Summary, DDx, ED Course, Reassessment, Tests Considered, Patient expectation:   45-year-old gentleman with past medical history of hypertension and prior CVA with chronic left-sided residual weakness presents today with concern for a fall. Hemodynamically stable on arrival to emergency room, nontoxic in no acute distress. He is not demonstrating any deficits from his normal baseline, alert and chronically disoriented. Pleasant, no obvious deformity or evidence of trauma. Differential diagnosis to include but not limited to intracranial hemorrhage, cervical fracture, rib fracture, pelvic fracture. As patient did appear to be slightly dehydrated and was very mildly tachycardic on arrival to my department, basic lab work obtained that did not demonstrate leukocytosis or anemia and there are no changes with electrolyte abnormalities. He was given a fluid bolus and tolerated well.   Chest x-ray did not demonstrate

## 2023-07-07 VITALS
RESPIRATION RATE: 20 BRPM | BODY MASS INDEX: 30.05 KG/M2 | WEIGHT: 187 LBS | HEIGHT: 66 IN | HEART RATE: 97 BPM | DIASTOLIC BLOOD PRESSURE: 104 MMHG | TEMPERATURE: 97 F | OXYGEN SATURATION: 95 % | SYSTOLIC BLOOD PRESSURE: 137 MMHG

## 2023-07-07 NOTE — ED NOTES
RN attempted to call report at this time. [facility] unable to find RN for report.       Carla Officer, YVETTE  07/07/23 8121

## 2023-07-07 NOTE — ED NOTES
RN attempted to call [address] [place] [address] for report x3, Phones were not answered.       Oralia Schmidt RN  07/07/23 5601

## 2023-09-05 LAB
ALBUMIN SERPL-MCNC: 4 G/DL (ref 3.5–5.2)
ALP SERPL-CCNC: 83 U/L (ref 40–129)
ALT SERPL-CCNC: 9 U/L (ref 0–40)
ANION GAP SERPL CALCULATED.3IONS-SCNC: 18 MMOL/L (ref 7–16)
AST SERPL-CCNC: 18 U/L (ref 0–39)
BASOPHILS # BLD: 0.04 K/UL (ref 0–0.2)
BASOPHILS NFR BLD: 1 % (ref 0–2)
BILIRUB SERPL-MCNC: 0.5 MG/DL (ref 0–1.2)
BUN SERPL-MCNC: 11 MG/DL (ref 6–23)
CALCIUM SERPL-MCNC: 9.4 MG/DL (ref 8.6–10.2)
CHLORIDE SERPL-SCNC: 102 MMOL/L (ref 98–107)
CHOLEST SERPL-MCNC: 153 MG/DL
CO2 SERPL-SCNC: 20 MMOL/L (ref 22–29)
CREAT SERPL-MCNC: 0.8 MG/DL (ref 0.7–1.2)
EOSINOPHIL # BLD: 0.09 K/UL (ref 0.05–0.5)
EOSINOPHILS RELATIVE PERCENT: 1 % (ref 0–6)
ERYTHROCYTE [DISTWIDTH] IN BLOOD BY AUTOMATED COUNT: 12.9 % (ref 11.5–15)
GFR SERPL CREATININE-BSD FRML MDRD: >60 ML/MIN/1.73M2
GLUCOSE P FAST SERPL-MCNC: 109 MG/DL (ref 74–99)
HBA1C MFR BLD: 6.4 % (ref 4–5.6)
HCT VFR BLD AUTO: 40.7 % (ref 37–54)
HDLC SERPL-MCNC: 53 MG/DL
HGB BLD-MCNC: 13.4 G/DL (ref 12.5–16.5)
IMM GRANULOCYTES # BLD AUTO: <0.03 K/UL (ref 0–0.58)
IMM GRANULOCYTES NFR BLD: 0 % (ref 0–5)
LDLC SERPL CALC-MCNC: 78 MG/DL
LYMPHOCYTES NFR BLD: 1.38 K/UL (ref 1.5–4)
LYMPHOCYTES RELATIVE PERCENT: 19 % (ref 20–42)
MCH RBC QN AUTO: 31.6 PG (ref 26–35)
MCHC RBC AUTO-ENTMCNC: 32.9 G/DL (ref 32–34.5)
MCV RBC AUTO: 96 FL (ref 80–99.9)
MONOCYTES NFR BLD: 0.36 K/UL (ref 0.1–0.95)
MONOCYTES NFR BLD: 5 % (ref 2–12)
NEUTROPHILS NFR BLD: 75 % (ref 43–80)
NEUTS SEG NFR BLD: 5.46 K/UL (ref 1.8–7.3)
PLATELET # BLD AUTO: 325 K/UL (ref 130–450)
PMV BLD AUTO: 9.6 FL (ref 7–12)
POTASSIUM SERPL-SCNC: 4.2 MMOL/L (ref 3.5–5)
PROT SERPL-MCNC: 7.3 G/DL (ref 6.4–8.3)
RBC # BLD AUTO: 4.24 M/UL (ref 3.8–5.8)
SODIUM SERPL-SCNC: 140 MMOL/L (ref 132–146)
TRIGL SERPL-MCNC: 109 MG/DL
VLDLC SERPL CALC-MCNC: 22 MG/DL
WBC OTHER # BLD: 7.3 K/UL (ref 4.5–11.5)

## 2023-12-14 LAB
ALBUMIN SERPL-MCNC: 3.9 G/DL (ref 3.5–5.2)
ALP SERPL-CCNC: 80 U/L (ref 40–129)
ALT SERPL-CCNC: 6 U/L (ref 0–40)
ANION GAP SERPL CALCULATED.3IONS-SCNC: 11 MMOL/L (ref 7–16)
AST SERPL-CCNC: 13 U/L (ref 0–39)
BASOPHILS # BLD: 0.03 K/UL (ref 0–0.2)
BASOPHILS NFR BLD: 1 % (ref 0–2)
BILIRUB SERPL-MCNC: 0.3 MG/DL (ref 0–1.2)
BUN SERPL-MCNC: 10 MG/DL (ref 6–23)
CALCIUM SERPL-MCNC: 9.3 MG/DL (ref 8.6–10.2)
CHLORIDE SERPL-SCNC: 106 MMOL/L (ref 98–107)
CO2 SERPL-SCNC: 25 MMOL/L (ref 22–29)
CREAT SERPL-MCNC: 0.7 MG/DL (ref 0.7–1.2)
EOSINOPHIL # BLD: 0.17 K/UL (ref 0.05–0.5)
EOSINOPHILS RELATIVE PERCENT: 3 % (ref 0–6)
ERYTHROCYTE [DISTWIDTH] IN BLOOD BY AUTOMATED COUNT: 12.9 % (ref 11.5–15)
GFR SERPL CREATININE-BSD FRML MDRD: >60 ML/MIN/1.73M2
GLUCOSE P FAST SERPL-MCNC: 92 MG/DL (ref 74–99)
HBA1C MFR BLD: 5.8 % (ref 4–5.6)
HCT VFR BLD AUTO: 37.4 % (ref 37–54)
HGB BLD-MCNC: 12.7 G/DL (ref 12.5–16.5)
IMM GRANULOCYTES # BLD AUTO: <0.03 K/UL (ref 0–0.58)
IMM GRANULOCYTES NFR BLD: 0 % (ref 0–5)
LYMPHOCYTES NFR BLD: 1.72 K/UL (ref 1.5–4)
LYMPHOCYTES RELATIVE PERCENT: 28 % (ref 20–42)
MCH RBC QN AUTO: 31.8 PG (ref 26–35)
MCHC RBC AUTO-ENTMCNC: 34 G/DL (ref 32–34.5)
MCV RBC AUTO: 93.7 FL (ref 80–99.9)
MONOCYTES NFR BLD: 0.37 K/UL (ref 0.1–0.95)
MONOCYTES NFR BLD: 6 % (ref 2–12)
NEUTROPHILS NFR BLD: 62 % (ref 43–80)
NEUTS SEG NFR BLD: 3.79 K/UL (ref 1.8–7.3)
PLATELET # BLD AUTO: 283 K/UL (ref 130–450)
PMV BLD AUTO: 9.6 FL (ref 7–12)
POTASSIUM SERPL-SCNC: 3.8 MMOL/L (ref 3.5–5)
PROT SERPL-MCNC: 6.9 G/DL (ref 6.4–8.3)
RBC # BLD AUTO: 3.99 M/UL (ref 3.8–5.8)
SODIUM SERPL-SCNC: 142 MMOL/L (ref 132–146)
WBC OTHER # BLD: 6.1 K/UL (ref 4.5–11.5)

## 2024-01-02 ENCOUNTER — TELEPHONE (OUTPATIENT)
Dept: NEUROLOGY | Age: 74
End: 2024-01-02

## 2024-04-04 ENCOUNTER — HOSPITAL ENCOUNTER (OUTPATIENT)
Dept: GENERAL RADIOLOGY | Age: 74
Discharge: HOME | End: 2024-04-06
Payer: MEDICARE

## 2024-04-04 DIAGNOSIS — R13.12 OROPHARYNGEAL DYSPHAGIA: ICD-10-CM

## 2024-04-04 PROCEDURE — 74230 X-RAY XM SWLNG FUNCJ C+: CPT

## 2024-04-04 PROCEDURE — 92526 ORAL FUNCTION THERAPY: CPT | Performed by: SPEECH-LANGUAGE PATHOLOGIST

## 2024-04-04 PROCEDURE — 92611 MOTION FLUOROSCOPY/SWALLOW: CPT | Performed by: SPEECH-LANGUAGE PATHOLOGIST

## 2024-04-04 PROCEDURE — 2500000003 HC RX 250 WO HCPCS: Performed by: PHYSICIAN ASSISTANT

## 2024-04-04 RX ADMIN — BARIUM SULFATE 15 ML: 400 PASTE ORAL at 08:52

## 2024-04-04 RX ADMIN — BARIUM SULFATE 15 ML: 400 SUSPENSION ORAL at 08:53

## 2024-04-04 NOTE — PROGRESS NOTES
with head elevated above 75 degrees    INSTRUMENTAL ASSESSMENT:    ORAL PREP/ ORAL PHASE:    Delayed A-P transit due to: decreased ability for initiation   , reduced lingual strength , and cognitive function   Lingual pumping present  Decreased bolus formation resulting in observed premature pharyngeal spillage  Piecemeal deglutition  Oral residuals were noted :  throughout the oral cavity     PHARYNGEAL PHASE:     ONSET TIME       Delayed initiation of the pharyngeal swallow was noted with swallow reflex triggered at the level of the vallecula and minimal spillage to pyriform sinus        PHARYNGEAL RESIDUALS        Vallecula/Pharyngeal Wall           Reduced tongue base retraction and impaired epiglottic retroflexion resulting in residuals in vallecula and/or posterior pharyngeal wall for honey consistency liquid which mostly cleared with spontaneous double swallow       Pyriform Sinuses      No significant residuals were noted in the pyriform sinuses     LARYNGEAL PENETRATION   Laryngeal penetration occurred in the absence of aspiration DURING the swallow for honey consistency liquid due to  inadequate laryngeal closure which cleared from the laryngeal vestibule spontaneously (transient). Laryngeal penetration was mild-moderate and occurred inconsistently   an absent cough/throat clear was noted    ASPIRATION  Aspiration occurred AFTER the swallow for honey consistency liquid due to residuals in the laryngeal vestibule . Aspiration was mild and occurred inconsistently .  a spontaneous cough was noted (a delayed cough was noted however unclear if it was voluntary per SLP instruction or involuntary)    PENETRATION-ASPIRATION SCALE (PAS):  THIN item not administered  MILDLY THICK item not administered  MODERATELY THICK 6 = Material enters the airway, passes below the vocal folds, and is ejected into the larynx or out of the airway   PUREE 1 = Material does not enter the airway  HARD SOLID item not

## 2024-05-30 ENCOUNTER — PREP FOR PROCEDURE (OUTPATIENT)
Dept: SURGERY | Age: 74
End: 2024-05-30

## 2024-05-30 RX ORDER — SODIUM CHLORIDE 9 MG/ML
INJECTION, SOLUTION INTRAVENOUS CONTINUOUS
Status: CANCELLED | OUTPATIENT
Start: 2024-05-30

## 2024-05-30 NOTE — H&P
[address] [address]  User  LR         Summary  No Active Medical Hx to Display  No Active Surgical Hx to Display  24  Employment  RE  Portal  Preferred Phone  [phone]  Address  [address] [place], [place] [id number]  Next of Kin  Person to Notify  Primary Insurance  [facility] Dual Complete SNP  No Data to Display  DATE  LOCATION/  PROVIDER  REASON FOR VISIT  24  07:30  Mohsen Alonso MD  malnutrition  DATE  LOCATION/  PROVIDER  PROBLEM  24  Mohsen Mejia MD  Unspecified severe protein-calorie malnutrition  24  [facility] [address]  Mohsen Mejia MD  Unspecified severe protein-calorie malnutrition  24  [facility]  Mohsen Mejia MD  [facility].PEGTUB  22  [facility]  Sameer Mathews MD  Nicotine dependence, unspecified, uncomplicated  No Data to Display  No Data to Display  PROTOCOLS  No Protocols to Display  OVERDUE ITEMS  LAST DONE  NEXT DUE  No Overdue Items to Display  No Data to Display  Kirt May  73, M1  MRN#   [id number]  Booked  [facility] SPS.[id number]     Visit Date: 24  Search Patient's Chart     24  09:37              No Data to Display  No Data to Display  24  No Data to Display  Colorectal Health  Kirt May  73  M  1950        Allergy/Adv: No Known Allergies  [facility]  [address]  [place] [address]    General Surgery Visit/H&P   Signed    Patient: Kirt May  MR#: [id number]  : 1950  Acct:[id number]  Age/Sex: 73 / M  ADM Date: 24  Loc: SPS.[id number]            Provider Location:   cc: ~        Intake  Vital Signs      24  09:37  Height   5 ft 9 in  Weight   130 lb  BMI   19.2  BP   97/57  Mean Arterial Pressure   70  Blood Pressure Location   Right Lower

## 2024-05-30 NOTE — H&P (VIEW-ONLY)
Arm  Position   Sitting    Intake  Visit Reasons: Peg Tube  Current Pain: No  Fall Within Last 3 Months: No  Date of Last Colonoscopy: 01/01/18  Allergies    No Known Allergies Allergy (Verified 05/22/24 09:38)      Safety Concerns: Feels Safe At This Time    HPI  History of Present Illness:   73-year-old male in a nursing facility, confined to a wheelchair.  Inadequate p.o. intake.  No previous abdominal surgery.  H&P for Procedures  H&P for Procedure: No    ROS  Const'l  Review of Systems Unobtainable: All Systems Reviewed and Are Unremarkable Except as Noted in HPI and Below  Constitutional: Denies Anorexia, Denies Fatigue, Denies Night Sweats, Denies Weight Gain, Denies Weight Loss and Denies Other (Anxiety)  Eyes  Eyes: Reports ROS Eyes System Reviewed and No Additional Complaints, Except as Documented  ENT  ENT: Reports ROS ENT System Reviewed and No Additional Complaints, Except as Documented  Cardio  Cardiovascular: Reports ROS CARD System Reviewed and No Additional Complaints, Except as documented, Denies Chest Pain, Denies Dyspnea, Denies Lightheadedness and Denies Palpitations  Respiratory  Respiratory: Reports ROS RESP System Reviewed and No Additional Complaints, Except as Documented, Denies Dyspnea, Denies Hemoptysis and Denies Wheezing  GI  Gastrointestinal: Reports As Per HPI and Denies Other (Hepatitis, liver disease and other gastrointestinal symptoms)    Genitourinary Male: Reports ROS  System Reviewed and No Additional Complaints, Except as Documented  Genitourinary Female: Reports ROS  System Reviewed and No Additional Complaints, Except as Documented  MS  Musculoskeletal: Reports System Reviewed and No Additional Complaints, Except as Documented  Skin  Skin: Reports ROS Skin System Reviewed and No Additional Complaints, Except as Documented and Denies Other (Skin, hair and nail symptoms)  Neuro  Neurological: Reports ROS NEURO System Reviewed and No Additional Complaints, Except as

## 2024-05-31 ENCOUNTER — OFFICE VISIT (OUTPATIENT)
Dept: NON INVASIVE DIAGNOSTICS | Age: 74
End: 2024-05-31
Payer: COMMERCIAL

## 2024-05-31 VITALS
HEART RATE: 98 BPM | OXYGEN SATURATION: 92 % | SYSTOLIC BLOOD PRESSURE: 90 MMHG | RESPIRATION RATE: 16 BRPM | DIASTOLIC BLOOD PRESSURE: 60 MMHG

## 2024-05-31 DIAGNOSIS — I10 ESSENTIAL HYPERTENSION: Primary | ICD-10-CM

## 2024-05-31 DIAGNOSIS — I63.9 CEREBROVASCULAR ACCIDENT (CVA), UNSPECIFIED MECHANISM (HCC): ICD-10-CM

## 2024-05-31 DIAGNOSIS — G45.9 TRANSIENT CEREBRAL ISCHEMIA, UNSPECIFIED TYPE: ICD-10-CM

## 2024-05-31 PROCEDURE — G8417 CALC BMI ABV UP PARAM F/U: HCPCS | Performed by: STUDENT IN AN ORGANIZED HEALTH CARE EDUCATION/TRAINING PROGRAM

## 2024-05-31 PROCEDURE — 3074F SYST BP LT 130 MM HG: CPT | Performed by: STUDENT IN AN ORGANIZED HEALTH CARE EDUCATION/TRAINING PROGRAM

## 2024-05-31 PROCEDURE — G8427 DOCREV CUR MEDS BY ELIG CLIN: HCPCS | Performed by: STUDENT IN AN ORGANIZED HEALTH CARE EDUCATION/TRAINING PROGRAM

## 2024-05-31 PROCEDURE — 3017F COLORECTAL CA SCREEN DOC REV: CPT | Performed by: STUDENT IN AN ORGANIZED HEALTH CARE EDUCATION/TRAINING PROGRAM

## 2024-05-31 PROCEDURE — 4004F PT TOBACCO SCREEN RCVD TLK: CPT | Performed by: STUDENT IN AN ORGANIZED HEALTH CARE EDUCATION/TRAINING PROGRAM

## 2024-05-31 PROCEDURE — 3078F DIAST BP <80 MM HG: CPT | Performed by: STUDENT IN AN ORGANIZED HEALTH CARE EDUCATION/TRAINING PROGRAM

## 2024-05-31 PROCEDURE — 99204 OFFICE O/P NEW MOD 45 MIN: CPT | Performed by: STUDENT IN AN ORGANIZED HEALTH CARE EDUCATION/TRAINING PROGRAM

## 2024-05-31 PROCEDURE — 1123F ACP DISCUSS/DSCN MKR DOCD: CPT | Performed by: STUDENT IN AN ORGANIZED HEALTH CARE EDUCATION/TRAINING PROGRAM

## 2024-05-31 PROCEDURE — 93000 ELECTROCARDIOGRAM COMPLETE: CPT | Performed by: STUDENT IN AN ORGANIZED HEALTH CARE EDUCATION/TRAINING PROGRAM

## 2024-05-31 NOTE — PATIENT INSTRUCTIONS
No medication changes.  Cardiac monitor applied today. Wear for 14 days. Return via mail.  Follow-up with this office in ~3 months.

## 2024-05-31 NOTE — PROGRESS NOTES
Patient was seen today and a 14 day Zio XT monitor was placed. Monitor was ordered by Dr. Yuri Newman. The monitor was applied. Instructions were given to the patient. Patient stated understanding and gave verbalize feed back.     Serial number [id number]      Alethea Moy MA   
care.  Please call me if there are any questions.      Yuri Newman DO .  Cardiac Electrophysiology  [facility]  [facility]

## 2024-06-03 NOTE — PROGRESS NOTES
Patient is a resident at [facility] telephone assessment completed with Carl Cuevas RN.      A&O:  YES  Code Status:  FULL  Ambulatory:  NO - WC - BART LIFT  Oxygen:  N/A  Hard of Hearing:  NO  Call light:  YES  Signs Documents?  NO  POA:  YADI CASTREJON - WILL ACCOMPANY DOS  Transport:  [facility]  Wounds:  NONE  Isolation:  N/A  Lines/Drains/Implanted devices:  N/A      Verified arrival time of 0500 with facility.  General instructions and medication instructions faxed to facility.

## 2024-06-03 NOTE — PROGRESS NOTES
[facility] PRE-ADMISSION TESTING INSTRUCTIONS    The Preadmission Testing patient is instructed accordingly using the following criteria (check applicable):    ARRIVAL INSTRUCTIONS:  [x] Parking the day of Surgery is located in the Main Entrance lot.  Upon entering the door, make an immediate right to the surgery reception desk    [x] Bring photo ID and insurance card    [x] Bring in a copy of Living will or Durable Power of  papers.    [x] Please be sure to arrange for a responsible adult to provide transportation to and from the hospital    [x] Please arrange for a responsible adult to be with you for the 24 hour period post procedure due to having anesthesia    [x] If you awake am of surgery not feeling well or have temperature >100 please call [phone]    GENERAL INSTRUCTIONS:    [x] No solid foods after midnight, may have up to 8oz of water until 4 hours prior to surgery. No gum, no candy, no mints.        [x] You may brush your teeth, do not swallow any toothpaste    [x] Take medications as instructed WITH ONLY WATER    [x] Stop herbal supplements and vitamins 5 days prior to procedure    [x] Follow preop dosing of blood thinners per physician instructions    [x] Bring inhalers day of surgery    [x] Shower or bath with soap, lather and rinse well, AM of Surgery, no lotion, powders or creams to surgical site    [x] No tobacco products within 24 hours of surgery     [x] No alcohol or illegal drug use, marijuana included, within 24 hours of surgery.    [x] Jewelry, body piercing's, eyeglasses, contact lenses and dentures are not permitted into surgery (bring cases)      [x] Please do not wear any nail polish, make up or hair products on the day of surgery    [x] You can expect a call the business day prior to procedure to notify you if your arrival time changes    [x] If you receive a survey after surgery we would greatly appreciate your comments    [x] A caregiver or family

## 2024-06-03 NOTE — PROGRESS NOTES
Spoke with Carl at facility who was updated on new arrival time of 0500.  Carl states he is unsure if he is able to change arrival time (was originally told 0600, however he set up transport for arrival time of 0800).  Carl to call back and verify earliest time patient will be able to get here.

## 2024-06-04 ENCOUNTER — ANESTHESIA EVENT (OUTPATIENT)
Dept: ENDOSCOPY | Age: 74
End: 2024-06-04
Payer: COMMERCIAL

## 2024-06-04 ENCOUNTER — HOSPITAL ENCOUNTER (OUTPATIENT)
Age: 74
Setting detail: OUTPATIENT SURGERY
Discharge: HOME | End: 2024-06-04
Attending: SURGERY | Admitting: SURGERY
Payer: COMMERCIAL

## 2024-06-04 ENCOUNTER — HOSPITAL ENCOUNTER (EMERGENCY)
Age: 74
Discharge: HOME | End: 2024-06-04
Attending: EMERGENCY MEDICINE
Payer: COMMERCIAL

## 2024-06-04 ENCOUNTER — ANESTHESIA (OUTPATIENT)
Dept: ENDOSCOPY | Age: 74
End: 2024-06-04
Payer: COMMERCIAL

## 2024-06-04 VITALS
SYSTOLIC BLOOD PRESSURE: 107 MMHG | HEART RATE: 96 BPM | OXYGEN SATURATION: 93 % | BODY MASS INDEX: 18.61 KG/M2 | WEIGHT: 126 LBS | TEMPERATURE: 98.2 F | DIASTOLIC BLOOD PRESSURE: 67 MMHG | RESPIRATION RATE: 16 BRPM

## 2024-06-04 VITALS
HEIGHT: 69 IN | WEIGHT: 126 LBS | RESPIRATION RATE: 18 BRPM | SYSTOLIC BLOOD PRESSURE: 109 MMHG | DIASTOLIC BLOOD PRESSURE: 69 MMHG | TEMPERATURE: 97.3 F | OXYGEN SATURATION: 97 % | HEART RATE: 97 BPM | BODY MASS INDEX: 18.66 KG/M2
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
## 2024-06-04 LAB
ALBUMIN SERPL-MCNC: 3.7 G/DL (ref 3.5–5.2)
ALP SERPL-CCNC: 82 U/L (ref 40–129)
ALT SERPL-CCNC: 17 U/L (ref 0–40)
ANION GAP SERPL CALCULATED.3IONS-SCNC: 15 MMOL/L (ref 7–16)
AST SERPL-CCNC: 20 U/L (ref 0–39)
BASOPHILS # BLD: 0.02 K/UL (ref 0–0.2)
BASOPHILS NFR BLD: 0 % (ref 0–2)
BILIRUB SERPL-MCNC: 0.3 MG/DL (ref 0–1.2)
BUN SERPL-MCNC: 23 MG/DL (ref 6–23)
CALCIUM SERPL-MCNC: 9.4 MG/DL (ref 8.6–10.2)
CHLORIDE SERPL-SCNC: 106 MMOL/L (ref 98–107)
CO2 SERPL-SCNC: 26 MMOL/L (ref 22–29)
CREAT SERPL-MCNC: 0.8 MG/DL (ref 0.7–1.2)
EOSINOPHIL # BLD: 0.05 K/UL (ref 0.05–0.5)
EOSINOPHILS RELATIVE PERCENT: 1 % (ref 0–6)
ERYTHROCYTE [DISTWIDTH] IN BLOOD BY AUTOMATED COUNT: 12.9 % (ref 11.5–15)
GFR, ESTIMATED: >90 ML/MIN/1.73M2
GLUCOSE BLD-MCNC: 126 MG/DL (ref 74–99)
GLUCOSE SERPL-MCNC: 125 MG/DL (ref 74–99)
HCT VFR BLD AUTO: 36.4 % (ref 37–54)
HGB BLD-MCNC: 11.3 G/DL (ref 12.5–16.5)
IMM GRANULOCYTES # BLD AUTO: 0.03 K/UL (ref 0–0.58)
IMM GRANULOCYTES NFR BLD: 0 % (ref 0–5)
LYMPHOCYTES NFR BLD: 1.39 K/UL (ref 1.5–4)
LYMPHOCYTES RELATIVE PERCENT: 19 % (ref 20–42)
MCH RBC QN AUTO: 31.3 PG (ref 26–35)
MCHC RBC AUTO-ENTMCNC: 31 G/DL (ref 32–34.5)
MCV RBC AUTO: 100.8 FL (ref 80–99.9)
MONOCYTES NFR BLD: 0.44 K/UL (ref 0.1–0.95)
MONOCYTES NFR BLD: 6 % (ref 2–12)
NEUTROPHILS NFR BLD: 74 % (ref 43–80)
NEUTS SEG NFR BLD: 5.43 K/UL (ref 1.8–7.3)
PLATELET # BLD AUTO: 665 K/UL (ref 130–450)
PMV BLD AUTO: 8.9 FL (ref 7–12)
POTASSIUM SERPL-SCNC: 3.7 MMOL/L (ref 3.5–5)
PROT SERPL-MCNC: 8.4 G/DL (ref 6.4–8.3)
RBC # BLD AUTO: 3.61 M/UL (ref 3.8–5.8)
RBC # BLD: ABNORMAL 10*6/UL
SODIUM SERPL-SCNC: 147 MMOL/L (ref 132–146)
WBC OTHER # BLD: 7.4 K/UL (ref 4.5–11.5)

## 2024-06-04 PROCEDURE — 2580000003 HC RX 258: Performed by: SURGERY

## 2024-06-04 PROCEDURE — 3609013300 HC EGD TUBE PLACEMENT: Performed by: SURGERY

## 2024-06-04 PROCEDURE — 7100000010 HC PHASE II RECOVERY - FIRST 15 MIN: Performed by: SURGERY

## 2024-06-04 PROCEDURE — 80053 COMPREHEN METABOLIC PANEL: CPT

## 2024-06-04 PROCEDURE — 6360000002 HC RX W HCPCS: Performed by: NURSE ANESTHETIST, CERTIFIED REGISTERED

## 2024-06-04 PROCEDURE — 3700000001 HC ADD 15 MINUTES (ANESTHESIA): Performed by: SURGERY

## 2024-06-04 PROCEDURE — 2709999900 HC NON-CHARGEABLE SUPPLY: Performed by: SURGERY

## 2024-06-04 PROCEDURE — 99283 EMERGENCY DEPT VISIT LOW MDM: CPT

## 2024-06-04 PROCEDURE — 85025 COMPLETE CBC W/AUTO DIFF WBC: CPT

## 2024-06-04 PROCEDURE — 7100000011 HC PHASE II RECOVERY - ADDTL 15 MIN: Performed by: SURGERY

## 2024-06-04 PROCEDURE — 3700000000 HC ANESTHESIA ATTENDED CARE: Performed by: SURGERY

## 2024-06-04 PROCEDURE — 82962 GLUCOSE BLOOD TEST: CPT

## 2024-06-04 PROCEDURE — 6360000002 HC RX W HCPCS: Performed by: SURGERY

## 2024-06-04 RX ORDER — PROPOFOL 10 MG/ML
INJECTION, EMULSION INTRAVENOUS PRN
Status: DISCONTINUED | OUTPATIENT
Start: 2024-06-04 | End: 2024-06-04 | Stop reason: SDUPTHER

## 2024-06-04 RX ORDER — SODIUM CHLORIDE 9 MG/ML
INJECTION, SOLUTION INTRAVENOUS CONTINUOUS
Status: DISCONTINUED | OUTPATIENT
Start: 2024-06-04 | End: 2024-06-04 | Stop reason: HOSPADM

## 2024-06-04 RX ADMIN — SODIUM CHLORIDE: 9 INJECTION, SOLUTION INTRAVENOUS at 07:29

## 2024-06-04 RX ADMIN — WATER 2000 MG: 1 INJECTION INTRAMUSCULAR; INTRAVENOUS; SUBCUTANEOUS at 07:29

## 2024-06-04 RX ADMIN — PROPOFOL 120 MG: 10 INJECTION, EMULSION INTRAVENOUS at 07:30

## 2024-06-04 ASSESSMENT — LIFESTYLE VARIABLES
SMOKING_STATUS: 1
HOW OFTEN DO YOU HAVE A DRINK CONTAINING ALCOHOL: NEVER
HOW MANY STANDARD DRINKS CONTAINING ALCOHOL DO YOU HAVE ON A TYPICAL DAY: PATIENT DOES NOT DRINK

## 2024-06-04 ASSESSMENT — PAIN SCALES - GENERAL: PAINLEVEL_OUTOF10: 0

## 2024-06-04 ASSESSMENT — PAIN - FUNCTIONAL ASSESSMENT
PAIN_FUNCTIONAL_ASSESSMENT: NONE - DENIES PAIN

## 2024-06-04 NOTE — ED PROVIDER NOTES
tobacco: Never   Substance Use Topics    Alcohol use: Not Currently    Drug use: No       SCREENINGS        Gayle Coma Scale  Eye Opening: Spontaneous  Best Verbal Response: Confused  Best Motor Response: Obeys commands  Gayle Coma Scale Score: 14                CIWA Assessment  BP: 107/67  Pulse: 96           PHYSICAL EXAM  1 or more Elements     ED Triage Vitals [06/04/24 0941]   BP Temp Temp src Pulse Respirations SpO2 Height Weight - Scale   113/77 97.9 °F (36.6 °C) -- 93 18 93 % -- 57.2 kg (126 lb)       Constitutional/General: Alert and oriented x3.  No acute distress  Head: Normocephalic and atraumatic  Eyes: PERRL, EOMI, conjunctiva normal, sclera non icteric  ENT:  Oropharynx clear, handling secretions  Neck: Supple, full ROM, no stridor, no meningeal signs, no C-spine tenderness  Respiratory: Lungs clear to auscultation bilaterally, no wheezes, rales, or rhonchi. Not in respiratory distress  Cardiovascular:  Regular rate. Regular rhythm.  GI:  Abdomen Soft, Non tender, Non distended.  No rebound, guarding, or rigidity.  PEG tube in place with no surrounding erythema, discharge, bleeding  Musculoskeletal: Moves all extremities x 4. Warm and well perfused, no clubbing, no cyanosis, no edema.   Integument: skin warm and dry. No rashes.   Neurologic: GCS 15, residual left-sided weakness, no new focal findings today  Psychiatric: Normal Affect            DIAGNOSTIC RESULTS   LABS:    Labs Reviewed   CBC WITH AUTO DIFFERENTIAL - Abnormal; Notable for the following components:       Result Value    RBC 3.61 (*)     Hemoglobin 11.3 (*)     Hematocrit 36.4 (*)     .8 (*)     MCHC 31.0 (*)     Platelets 665 (*)     Lymphocytes % 19 (*)     Lymphocytes Absolute 1.39 (*)     All other components within normal limits   COMPREHENSIVE METABOLIC PANEL W/ REFLEX TO MG FOR LOW K - Abnormal; Notable for the following components:    Sodium 147 (*)     Glucose 125 (*)     Total Protein 8.4 (*)     All other

## 2024-06-04 NOTE — OP NOTE
Op Note    iKrt May  YOB: 1950  [id number]    DATE OF PROCEDURE: 6/4/2024    SURGEON: MOHSEN MEJIA M.D.    ASSISTANT: None    PREOPERATIVE DIAGNOSIS: Malnutrition   .  POSTOPERATIVE DIAGNOSIS: Same    OPERATION: EGD with PEG placement.    ANESTHESIA: LMAC.    ESTIMATED BLOOD LOSS: none    COMPLICATIONS: None.    SPECIMENS: None.      DESCRIPTION OF PROCEDURE: The patient was taken to the endoscopy suite and  placed on the endoscopy table in a supine position. LMAC anesthesia was  administered. A bite block was inserted.     A lubricated gastroscope was inserted through the oropharynx and advanced  under direct vision to the esophagus and then the stomach. The stomach was  insufflated. The anterior abdominal wall was transilluminated. This area  was marked, prepped, and draped. Local anesthetic was injected. An 11 blade  was used to make a transverse incision. A snare forceps was inserted through the  gastroscope and deployed into the gastric lumen. An angiocatheter was  inserted through the incision and inserted into the gastric lumen under  direct vision. A wire was inserted through the angiocatheter and grasped with  a snare. The gastroscope, snare, and wire were then retrieved.The wire was   connected to the gastrostomy tube and then pulled through the esophagus,   stomach, and out through the anterior abdominal wall. The gastroscope was   reintroduced into the stomach. The PEG tube was seen in good position without   evidence of bleeding. The gastroscope was removed. The PEG tube was fit with   a bumper and feeding port and connected to gravity. A binder was applied.   The patient tolerated the procedure well.    Mohsen Mejia MD  6/4/2024  6:55 AM

## 2024-06-04 NOTE — PROGRESS NOTES
Nurse to nurse report called to Aleisha Morgan. POA updated. Awaiting [facility] transportation.

## 2024-06-04 NOTE — ANESTHESIA PRE PROCEDURE
Department of Anesthesiology  Preprocedure Note       Name:  Kirt May   Age:  73 y.o.  :  1950                                          MRN:  [id number]         Date:  2024      Surgeon: Surgeon(s):  Mohsen Mejia MD    Procedure: Procedure(s):  ESOPHAGOGASTRODUODENOSCOPY PERCUTANEOUS ENDOSCOPIC GASTROSTOMY TUBE PLACEMENT             +++FACILITY+++    Medications prior to admission:   Prior to Admission medications    Medication Sig Start Date End Date Taking? Authorizing Provider   baclofen (LIORESAL) 20 MG tablet Take 1 tablet by mouth 3 times daily 24   Nii Ceron APRN - CNS   acetaminophen (TYLENOL) 325 MG tablet Take 2 tablets by mouth every 6 hours as needed for Pain    ProviderUyen MD   bisacodyl (DULCOLAX) 10 MG suppository Place 1 suppository rectally daily as needed for Constipation    ProviderUyen MD   polyethylene glycol (GLYCOLAX) 17 g packet Take 1 packet by mouth daily as needed for Constipation    ProviderUyen MD   magnesium hydroxide (MILK OF MAGNESIA) 400 MG/5ML suspension Take 30 mLs by mouth daily as needed for Constipation    ProviderUyen MD   traMADol (ULTRAM) 50 MG tablet Take 1 tablet by mouth every 8 hours as needed for Pain.    ProviderUyen MD   losartan (COZAAR) 50 MG tablet Take 1 tablet by mouth in the morning and at bedtime 23   Kirt Faith, APRN - CNP   amLODIPine (NORVASC) 5 MG tablet Take 1 tablet by mouth daily 23   Kirt Faith, APRN - CNP   clopidogrel (PLAVIX) 75 MG tablet Take 1 tablet by mouth daily 23   Kirt Faith, APRN - CNP   aspirin 81 MG EC tablet Take 1 tablet by mouth daily    Uyen Escalante MD   metFORMIN (GLUCOPHAGE) 500 MG tablet Take 1 tablet by mouth 2 times daily (with meals)    Uyen Escalante MD   Multiple Vitamin (MULTI-DAY VITAMINS) TABS Take 1 tablet by mouth daily    Uyen Escalante MD   salmeterol (SEREVENT DISKUS) 50 MCG/ACT AEPB

## 2024-06-04 NOTE — ANESTHESIA POSTPROCEDURE EVALUATION
Department of Anesthesiology  Postprocedure Note    Patient: Kirt May  MRN: [id number]  YOB: 1950  Date of evaluation: 6/4/2024    Procedure Summary       Date: 06/04/24 Room / Location: [place] [address] / [facility]    Anesthesia Start: 0729 Anesthesia Stop: 0746    Procedure: ESOPHAGOGASTRODUODENOSCOPY PERCUTANEOUS ENDOSCOPIC GASTROSTOMY TUBE PLACEMENT             +++FACILITY+++ Diagnosis:       Alimentary edema (HCC)      (Alimentary edema (HCC) [E43])    Surgeons: Mohsen Mejia MD Responsible Provider: Amelie Herrera DO    Anesthesia Type: MAC ASA Status: 4            Anesthesia Type: MAC    Patricia Phase I: Patricia Score: 9    Patricia Phase II: Patricia Score: 10    Anesthesia Post Evaluation    Patient location during evaluation: PACU  Patient participation: complete - patient participated  Level of consciousness: awake and alert  Airway patency: patent  Nausea & Vomiting: no nausea and no vomiting  Cardiovascular status: blood pressure returned to baseline  Respiratory status: acceptable  Hydration status: euvolemic        No notable events documented.

## 2024-06-04 NOTE — INTERVAL H&P NOTE
Update History & Physical    The patient's History and Physical of May 22, 2024 was reviewed with the patient and I examined the patient. There was no change. The surgical site was confirmed by the patient and me.     Plan: The risks, benefits, expected outcome, and alternative to the recommended procedure have been discussed with the patient. Patient understands and wants to proceed with the procedure.     Electronically signed by Mohsen Mejia MD on 6/4/2024 at 6:54 AM

## 2024-07-01 DIAGNOSIS — G45.9 TRANSIENT CEREBRAL ISCHEMIA, UNSPECIFIED TYPE: ICD-10-CM

## 2024-07-01 DIAGNOSIS — I63.9 CEREBROVASCULAR ACCIDENT (CVA), UNSPECIFIED MECHANISM (HCC): ICD-10-CM

## 2024-07-09 ENCOUNTER — TELEPHONE (OUTPATIENT)
Dept: NON INVASIVE DIAGNOSTICS | Age: 74
End: 2024-07-09

## 2024-07-09 NOTE — TELEPHONE ENCOUNTER
----- Message from Yuri Newman DO sent at 7/8/2024  9:44 PM EDT -----  Regarding: monitor  monitor: no significant findings.    Recommend Medtronic LINQ implant for cryptogenic stroke.    -uYri Newman DO

## 2024-07-30 NOTE — TELEPHONE ENCOUNTER
Primary number in chart, person stated it is the wrong number for Kirt.  I called the secondary number and left a message.  I tried calling patient contact and they hung up on me.  Letter mailed for patient to call back.

## 2024-09-26 ENCOUNTER — OFFICE VISIT (OUTPATIENT)
Dept: NON INVASIVE DIAGNOSTICS | Age: 74
End: 2024-09-26
Payer: COMMERCIAL

## 2024-09-26 VITALS — HEART RATE: 74 BPM | DIASTOLIC BLOOD PRESSURE: 60 MMHG | SYSTOLIC BLOOD PRESSURE: 102 MMHG | RESPIRATION RATE: 16 BRPM

## 2024-09-26 DIAGNOSIS — I51.9 HEART PROBLEM: Primary | ICD-10-CM

## 2024-09-26 DIAGNOSIS — R53.81 PHYSICAL DECONDITIONING: ICD-10-CM

## 2024-09-26 DIAGNOSIS — Z99.3 WHEELCHAIR DEPENDENCE: ICD-10-CM

## 2024-09-26 DIAGNOSIS — G81.94 LEFT HEMIPLEGIA (HCC): ICD-10-CM

## 2024-09-26 DIAGNOSIS — I63.9 CEREBROVASCULAR ACCIDENT (CVA), UNSPECIFIED MECHANISM (HCC): ICD-10-CM

## 2024-09-26 DIAGNOSIS — R41.0 CONFUSION: ICD-10-CM

## 2024-09-26 DIAGNOSIS — I10 ESSENTIAL HYPERTENSION: ICD-10-CM

## 2024-09-26 PROCEDURE — 4004F PT TOBACCO SCREEN RCVD TLK: CPT | Performed by: NURSE PRACTITIONER

## 2024-09-26 PROCEDURE — 3074F SYST BP LT 130 MM HG: CPT | Performed by: NURSE PRACTITIONER

## 2024-09-26 PROCEDURE — 3017F COLORECTAL CA SCREEN DOC REV: CPT | Performed by: NURSE PRACTITIONER

## 2024-09-26 PROCEDURE — 3078F DIAST BP <80 MM HG: CPT | Performed by: NURSE PRACTITIONER

## 2024-09-26 PROCEDURE — G8420 CALC BMI NORM PARAMETERS: HCPCS | Performed by: NURSE PRACTITIONER

## 2024-09-26 PROCEDURE — 1123F ACP DISCUSS/DSCN MKR DOCD: CPT | Performed by: NURSE PRACTITIONER

## 2024-09-26 PROCEDURE — 99214 OFFICE O/P EST MOD 30 MIN: CPT | Performed by: NURSE PRACTITIONER

## 2024-09-26 PROCEDURE — G8427 DOCREV CUR MEDS BY ELIG CLIN: HCPCS | Performed by: NURSE PRACTITIONER

## 2024-09-26 PROCEDURE — 93000 ELECTROCARDIOGRAM COMPLETE: CPT | Performed by: STUDENT IN AN ORGANIZED HEALTH CARE EDUCATION/TRAINING PROGRAM

## 2024-10-13 ENCOUNTER — APPOINTMENT (OUTPATIENT)
Dept: GENERAL RADIOLOGY | Age: 74
End: 2024-10-13
Payer: COMMERCIAL

## 2024-10-13 ENCOUNTER — HOSPITAL ENCOUNTER (EMERGENCY)
Age: 74
Discharge: HOME | End: 2024-10-14
Attending: STUDENT IN AN ORGANIZED HEALTH CARE EDUCATION/TRAINING PROGRAM
Payer: COMMERCIAL

## 2024-10-13 DIAGNOSIS — Z43.1 ATTENTION TO G-TUBE (HCC): Primary | ICD-10-CM

## 2024-10-13 PROCEDURE — 6360000004 HC RX CONTRAST MEDICATION

## 2024-10-13 PROCEDURE — 99283 EMERGENCY DEPT VISIT LOW MDM: CPT

## 2024-10-13 PROCEDURE — 74018 RADEX ABDOMEN 1 VIEW: CPT

## 2024-10-13 RX ORDER — DIATRIZOATE MEGLUMINE AND DIATRIZOATE SODIUM 660; 100 MG/ML; MG/ML
30 SOLUTION ORAL; RECTAL
Status: DISCONTINUED | OUTPATIENT
Start: 2024-10-13 | End: 2024-10-14 | Stop reason: HOSPADM

## 2024-10-13 RX ADMIN — DIATRIZOATE MEGLUMINE AND DIATRIZOATE SODIUM 30 ML: 600; 100 SOLUTION ORAL; RECTAL at 23:53

## 2024-10-13 ASSESSMENT — LIFESTYLE VARIABLES
HOW OFTEN DO YOU HAVE A DRINK CONTAINING ALCOHOL: NEVER
HOW MANY STANDARD DRINKS CONTAINING ALCOHOL DO YOU HAVE ON A TYPICAL DAY: PATIENT DOES NOT DRINK

## 2024-10-14 VITALS
HEART RATE: 72 BPM | RESPIRATION RATE: 18 BRPM | SYSTOLIC BLOOD PRESSURE: 120 MMHG | TEMPERATURE: 97.6 F | DIASTOLIC BLOOD PRESSURE: 76 MMHG | OXYGEN SATURATION: 96 %

## 2024-10-14 NOTE — ED PROVIDER NOTES
[facility] EMERGENCY DEPARTMENT  EMERGENCY DEPARTMENT ENCOUNTER        Pt Name: Kirt May  MRN: [id number]  Birthdate 1950  Date of evaluation: 10/13/2024  Provider: Channing Evans MD  PCP: Abhijit Condon MD  Note Started: 9:15 PM EDT 10/13/24    CHIEF COMPLAINT       Chief Complaint   Patient presents with    G Tube Complications     Pt pulled out peg tube at nursing facility and needs a new peg tube placed       HISTORY OF PRESENT ILLNESS: 1 or more Elements   History From: Patient and EMS    Limitations to history : None  Social Determinants : None    Kirt May is a 74 y.o. male who presents from nursing facility for PEG tube replacement.  As per EMS report, patient pulled out his PEG tube and he was brought in for replacement.    Currently patient denies any abdominal pain chest pain, nausea or vomiting.    PEG tube placed on 6//2024.    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    Nursing Notes were all reviewed and agreed with or any disagreements were addressed in the HPI.    ROS:   Pertinent positives and negatives are stated within HPI, all other systems reviewed and are negative.      --------------------------------------------- PAST HISTORY ---------------------------------------------  Past Medical History:       Diagnosis Date    Benign prostate hyperplasia     Cerebral artery occlusion with cerebral infarction (HCC)     Diabetes mellitus (HCC)     Dysphagia     Hemiplegia (HCC)     Hyperlipidemia     Hypertension     Urinary incontinence        Past Surgical History:       Procedure Laterality Date    UPPER GASTROINTESTINAL ENDOSCOPY N/A 6/4/2024    ESOPHAGOGASTRODUODENOSCOPY PERCUTANEOUS ENDOSCOPIC GASTROSTOMY TUBE PLACEMENT             +++FACILITY+++ performed by Mohsen Mejia MD at [facility] ENDOSCOPY       Social History:  reports that he has been smoking cigarettes. He started smoking about

## 2024-10-14 NOTE — DISCHARGE INSTRUCTIONS
Kirt was seen today for his G-tube being pulled out.  We replaced it in the emergency department.  Please have him return to the emergency department if he pulls out again, it does not appear to be functioning, and he is having bleeding coming from his G-tube site or signs of infection

## 2024-10-14 NOTE — ED PROVIDER NOTES
[facility] EMERGENCY DEPARTMENT  EMERGENCY DEPARTMENT ENCOUNTER        Pt Name: Kirt May  MRN: [id number]  Birthdate 1950  Date of evaluation: 10/13/2024  Provider: Stas Mcfadden MD  PCP: Abhijit Condon MD  Note Started: 8:39 PM EDT 10/13/24    CHIEF COMPLAINT       Chief Complaint   Patient presents with    G Tube Complications     Pt pulled out peg tube at nursing facility and needs a new peg tube placed       HISTORY OF PRESENT ILLNESS: 1 or more Elements        Limitations to history : Dementia    Kirt May is a 74 y.o. male who presents to the ER for pulled out PEG tube.  History [facility] by EMS as patient is confused at baseline.  Patient has no complaints and is pleasant and speaking with him.  Alert to self on exam.  This is baseline per EMS.  No reported infection symptoms around the PEG tube site.    Nursing Notes were all reviewed and agreed with or any disagreements were addressed in the HPI.      REVIEW OF EXTERNAL NOTE :       Surgery 6/4/2024 for EGD with G-tube placement    REVIEW OF SYSTEMS :      Positives and Pertinent negatives as per HPI.     SURGICAL HISTORY     Past Surgical History:   Procedure Laterality Date    UPPER GASTROINTESTINAL ENDOSCOPY N/A 6/4/2024    ESOPHAGOGASTRODUODENOSCOPY PERCUTANEOUS ENDOSCOPIC GASTROSTOMY TUBE PLACEMENT             +++FACILITY+++ performed by Mohsen Mejia MD at [facility] ENDOSCOPY       CURRENTMEDICATIONS       Previous Medications    ACETAMINOPHEN (TYLENOL) 325 MG TABLET    Take 2 tablets by mouth every 6 hours as needed for Pain    ALBUTEROL SULFATE HFA (VENTOLIN HFA) 108 (90 BASE) MCG/ACT INHALER    Inhale 2 puffs into the lungs every 6 hours as needed for Wheezing    AMLODIPINE (NORVASC) 5 MG TABLET    Take 1 tablet by mouth daily    ASPIRIN 81 MG EC TABLET    Take 1 tablet by mouth daily    BACLOFEN (LIORESAL) 20 MG TABLET    Take 1 tablet by mouth 3 times daily    BISACODYL (DULCOLAX) 10 MG

## 2024-10-28 ENCOUNTER — APPOINTMENT (OUTPATIENT)
Dept: GENERAL RADIOLOGY | Age: 74
DRG: 378 | End: 2024-10-28
Payer: MEDICARE

## 2024-10-28 ENCOUNTER — HOSPITAL ENCOUNTER (INPATIENT)
Age: 74
LOS: 1 days | Discharge: NURSING, REHAB OR LONG-TERM CARE | DRG: 378 | End: 2024-10-31
Attending: STUDENT IN AN ORGANIZED HEALTH CARE EDUCATION/TRAINING PROGRAM | Admitting: STUDENT IN AN ORGANIZED HEALTH CARE EDUCATION/TRAINING PROGRAM
Payer: MEDICARE

## 2024-10-28 ENCOUNTER — APPOINTMENT (OUTPATIENT)
Dept: CT IMAGING | Age: 74
DRG: 378 | End: 2024-10-28
Payer: MEDICARE

## 2024-10-28 DIAGNOSIS — R00.0 TACHYCARDIA: ICD-10-CM

## 2024-10-28 DIAGNOSIS — K92.0 COFFEE GROUND EMESIS: Primary | ICD-10-CM

## 2024-10-28 PROBLEM — K92.2 GIB (GASTROINTESTINAL BLEEDING): Status: ACTIVE | Noted: 2024-10-28

## 2024-10-28 PROBLEM — K59.00 CONSTIPATION: Status: ACTIVE | Noted: 2024-10-28

## 2024-10-28 PROBLEM — E87.20 LACTIC ACIDOSIS: Status: ACTIVE | Noted: 2024-10-28

## 2024-10-28 LAB
25(OH)D3 SERPL-MCNC: 23.8 NG/ML (ref 30–100)
ABO + RH BLD: NORMAL
ALBUMIN SERPL-MCNC: 3.9 G/DL (ref 3.5–5.2)
ALP SERPL-CCNC: 110 U/L (ref 40–129)
ALT SERPL-CCNC: 14 U/L (ref 0–40)
ANION GAP SERPL CALCULATED.3IONS-SCNC: 13 MMOL/L (ref 7–16)
ARM BAND NUMBER: NORMAL
AST SERPL-CCNC: 25 U/L (ref 0–39)
BASOPHILS # BLD: 0.02 K/UL (ref 0–0.2)
BASOPHILS NFR BLD: 0 % (ref 0–2)
BILIRUB SERPL-MCNC: 0.4 MG/DL (ref 0–1.2)
BILIRUB UR QL STRIP: NEGATIVE
BLOOD BANK SAMPLE EXPIRATION: NORMAL
BLOOD GROUP ANTIBODIES SERPL: NEGATIVE
BUN SERPL-MCNC: 21 MG/DL (ref 6–23)
CALCIUM SERPL-MCNC: 9.8 MG/DL (ref 8.6–10.2)
CHLORIDE SERPL-SCNC: 98 MMOL/L (ref 98–107)
CHOLEST SERPL-MCNC: 101 MG/DL
CLARITY UR: CLEAR
CO2 SERPL-SCNC: 28 MMOL/L (ref 22–29)
COLOR UR: YELLOW
CREAT SERPL-MCNC: 0.9 MG/DL (ref 0.7–1.2)
CRYSTALS URNS MICRO: ABNORMAL /HPF
EOSINOPHIL # BLD: 0.01 K/UL (ref 0.05–0.5)
EOSINOPHILS RELATIVE PERCENT: 0 % (ref 0–6)
ERYTHROCYTE [DISTWIDTH] IN BLOOD BY AUTOMATED COUNT: 14.7 % (ref 11.5–15)
FOLATE SERPL-MCNC: >20 NG/ML (ref 4.8–24.2)
GFR, ESTIMATED: >90 ML/MIN/1.73M2
GLUCOSE BLD-MCNC: 106 MG/DL (ref 74–99)
GLUCOSE BLD-MCNC: 112 MG/DL (ref 74–99)
GLUCOSE BLD-MCNC: 115 MG/DL (ref 74–99)
GLUCOSE SERPL-MCNC: 115 MG/DL (ref 74–99)
GLUCOSE UR STRIP-MCNC: NEGATIVE MG/DL
HCT VFR BLD AUTO: 41.1 % (ref 37–54)
HCT VFR BLD AUTO: 44.1 % (ref 37–54)
HCT VFR BLD AUTO: 44.3 % (ref 37–54)
HDLC SERPL-MCNC: 41 MG/DL
HGB BLD-MCNC: 13 G/DL (ref 12.5–16.5)
HGB BLD-MCNC: 14.2 G/DL (ref 12.5–16.5)
HGB BLD-MCNC: 14.3 G/DL (ref 12.5–16.5)
HGB UR QL STRIP.AUTO: NEGATIVE
IMM GRANULOCYTES # BLD AUTO: 0.04 K/UL (ref 0–0.58)
IMM GRANULOCYTES NFR BLD: 0 % (ref 0–5)
KETONES UR STRIP-MCNC: NEGATIVE MG/DL
LACTATE BLDV-SCNC: 1.5 MMOL/L (ref 0.5–2.2)
LACTATE BLDV-SCNC: 2.4 MMOL/L (ref 0.5–2.2)
LDLC SERPL CALC-MCNC: 48 MG/DL
LEUKOCYTE ESTERASE UR QL STRIP: NEGATIVE
LIPASE SERPL-CCNC: 15 U/L (ref 13–60)
LYMPHOCYTES NFR BLD: 1.69 K/UL (ref 1.5–4)
LYMPHOCYTES RELATIVE PERCENT: 17 % (ref 20–42)
MAGNESIUM SERPL-MCNC: 1.6 MG/DL (ref 1.6–2.6)
MCH RBC QN AUTO: 30.6 PG (ref 26–35)
MCHC RBC AUTO-ENTMCNC: 32.2 G/DL (ref 32–34.5)
MCV RBC AUTO: 95 FL (ref 80–99.9)
MONOCYTES NFR BLD: 0.53 K/UL (ref 0.1–0.95)
MONOCYTES NFR BLD: 5 % (ref 2–12)
NEUTROPHILS NFR BLD: 77 % (ref 43–80)
NEUTS SEG NFR BLD: 7.66 K/UL (ref 1.8–7.3)
NITRITE UR QL STRIP: NEGATIVE
PH UR STRIP: 6 [PH] (ref 5–9)
PHOSPHATE SERPL-MCNC: 3.2 MG/DL (ref 2.5–4.5)
PLATELET # BLD AUTO: 277 K/UL (ref 130–450)
PMV BLD AUTO: 10.5 FL (ref 7–12)
POTASSIUM SERPL-SCNC: 4.1 MMOL/L (ref 3.5–5)
PROT SERPL-MCNC: 8.5 G/DL (ref 6.4–8.3)
PROT UR STRIP-MCNC: NEGATIVE MG/DL
RBC # BLD AUTO: 4.64 M/UL (ref 3.8–5.8)
RBC #/AREA URNS HPF: ABNORMAL /HPF
SODIUM SERPL-SCNC: 139 MMOL/L (ref 132–146)
SP GR UR STRIP: 1.01 (ref 1–1.03)
TRIGL SERPL-MCNC: 58 MG/DL
TSH SERPL DL<=0.05 MIU/L-ACNC: 1.21 UIU/ML (ref 0.27–4.2)
UROBILINOGEN UR STRIP-ACNC: 1 EU/DL (ref 0–1)
VIT B12 SERPL-MCNC: 722 PG/ML (ref 211–946)
VLDLC SERPL CALC-MCNC: 12 MG/DL
WBC #/AREA URNS HPF: ABNORMAL /HPF
WBC OTHER # BLD: 10 K/UL (ref 4.5–11.5)

## 2024-10-28 PROCEDURE — 6370000000 HC RX 637 (ALT 250 FOR IP): Performed by: NURSE PRACTITIONER

## 2024-10-28 PROCEDURE — 71046 X-RAY EXAM CHEST 2 VIEWS: CPT

## 2024-10-28 PROCEDURE — 6360000002 HC RX W HCPCS: Performed by: NURSE PRACTITIONER

## 2024-10-28 PROCEDURE — 84443 ASSAY THYROID STIM HORMONE: CPT

## 2024-10-28 PROCEDURE — 96375 TX/PRO/DX INJ NEW DRUG ADDON: CPT

## 2024-10-28 PROCEDURE — G0378 HOSPITAL OBSERVATION PER HR: HCPCS

## 2024-10-28 PROCEDURE — 6360000002 HC RX W HCPCS: Performed by: STUDENT IN AN ORGANIZED HEALTH CARE EDUCATION/TRAINING PROGRAM

## 2024-10-28 PROCEDURE — 86901 BLOOD TYPING SEROLOGIC RH(D): CPT

## 2024-10-28 PROCEDURE — 80053 COMPREHEN METABOLIC PANEL: CPT

## 2024-10-28 PROCEDURE — 81001 URINALYSIS AUTO W/SCOPE: CPT

## 2024-10-28 PROCEDURE — 85018 HEMOGLOBIN: CPT

## 2024-10-28 PROCEDURE — 86850 RBC ANTIBODY SCREEN: CPT

## 2024-10-28 PROCEDURE — 99223 1ST HOSP IP/OBS HIGH 75: CPT | Performed by: NURSE PRACTITIONER

## 2024-10-28 PROCEDURE — 82607 VITAMIN B-12: CPT

## 2024-10-28 PROCEDURE — 86900 BLOOD TYPING SEROLOGIC ABO: CPT

## 2024-10-28 PROCEDURE — 96374 THER/PROPH/DIAG INJ IV PUSH: CPT

## 2024-10-28 PROCEDURE — 83605 ASSAY OF LACTIC ACID: CPT

## 2024-10-28 PROCEDURE — 85014 HEMATOCRIT: CPT

## 2024-10-28 PROCEDURE — 94640 AIRWAY INHALATION TREATMENT: CPT

## 2024-10-28 PROCEDURE — 2580000003 HC RX 258: Performed by: RADIOLOGY

## 2024-10-28 PROCEDURE — 2580000003 HC RX 258: Performed by: NURSE PRACTITIONER

## 2024-10-28 PROCEDURE — 2700000000 HC OXYGEN THERAPY PER DAY

## 2024-10-28 PROCEDURE — 2580000003 HC RX 258: Performed by: STUDENT IN AN ORGANIZED HEALTH CARE EDUCATION/TRAINING PROGRAM

## 2024-10-28 PROCEDURE — 84100 ASSAY OF PHOSPHORUS: CPT

## 2024-10-28 PROCEDURE — 85025 COMPLETE CBC W/AUTO DIFF WBC: CPT

## 2024-10-28 PROCEDURE — 83690 ASSAY OF LIPASE: CPT

## 2024-10-28 PROCEDURE — 99285 EMERGENCY DEPT VISIT HI MDM: CPT

## 2024-10-28 PROCEDURE — 83540 ASSAY OF IRON: CPT

## 2024-10-28 PROCEDURE — 82306 VITAMIN D 25 HYDROXY: CPT

## 2024-10-28 PROCEDURE — 74177 CT ABD & PELVIS W/CONTRAST: CPT

## 2024-10-28 PROCEDURE — 82746 ASSAY OF FOLIC ACID SERUM: CPT

## 2024-10-28 PROCEDURE — 6360000004 HC RX CONTRAST MEDICATION: Performed by: RADIOLOGY

## 2024-10-28 PROCEDURE — 83735 ASSAY OF MAGNESIUM: CPT

## 2024-10-28 PROCEDURE — 82962 GLUCOSE BLOOD TEST: CPT

## 2024-10-28 PROCEDURE — 96376 TX/PRO/DX INJ SAME DRUG ADON: CPT

## 2024-10-28 PROCEDURE — 80061 LIPID PANEL: CPT

## 2024-10-28 PROCEDURE — 83550 IRON BINDING TEST: CPT

## 2024-10-28 PROCEDURE — 36415 COLL VENOUS BLD VENIPUNCTURE: CPT

## 2024-10-28 RX ORDER — MECOBALAMIN 5000 MCG
5 TABLET,DISINTEGRATING ORAL EVERY EVENING
Status: DISCONTINUED | OUTPATIENT
Start: 2024-10-28 | End: 2024-10-31 | Stop reason: HOSPADM

## 2024-10-28 RX ORDER — ONDANSETRON 4 MG/1
4 TABLET, ORALLY DISINTEGRATING ORAL EVERY 8 HOURS PRN
Status: DISCONTINUED | OUTPATIENT
Start: 2024-10-28 | End: 2024-10-31 | Stop reason: HOSPADM

## 2024-10-28 RX ORDER — ACETAMINOPHEN 650 MG/1
650 SUPPOSITORY RECTAL EVERY 6 HOURS PRN
Status: DISCONTINUED | OUTPATIENT
Start: 2024-10-28 | End: 2024-10-31 | Stop reason: HOSPADM

## 2024-10-28 RX ORDER — TRAMADOL HYDROCHLORIDE 50 MG/1
50 TABLET ORAL EVERY 8 HOURS PRN
Status: DISCONTINUED | OUTPATIENT
Start: 2024-10-28 | End: 2024-10-31 | Stop reason: HOSPADM

## 2024-10-28 RX ORDER — LOSARTAN POTASSIUM 50 MG/1
50 TABLET ORAL 2 TIMES DAILY
Status: DISCONTINUED | OUTPATIENT
Start: 2024-10-28 | End: 2024-10-31 | Stop reason: HOSPADM

## 2024-10-28 RX ORDER — SODIUM CHLORIDE 0.9 % (FLUSH) 0.9 %
10 SYRINGE (ML) INJECTION PRN
Status: COMPLETED | OUTPATIENT
Start: 2024-10-28 | End: 2024-10-28

## 2024-10-28 RX ORDER — GLUCAGON 1 MG/ML
1 KIT INJECTION PRN
Status: DISCONTINUED | OUTPATIENT
Start: 2024-10-28 | End: 2024-10-31 | Stop reason: HOSPADM

## 2024-10-28 RX ORDER — INSULIN LISPRO 100 [IU]/ML
0-4 INJECTION, SOLUTION INTRAVENOUS; SUBCUTANEOUS
Status: DISCONTINUED | OUTPATIENT
Start: 2024-10-28 | End: 2024-10-31 | Stop reason: HOSPADM

## 2024-10-28 RX ORDER — BENZONATATE 100 MG/1
100 CAPSULE ORAL 3 TIMES DAILY PRN
Status: DISCONTINUED | OUTPATIENT
Start: 2024-10-28 | End: 2024-10-31 | Stop reason: HOSPADM

## 2024-10-28 RX ORDER — POLYETHYLENE GLYCOL 3350 17 G/17G
17 POWDER, FOR SOLUTION ORAL DAILY PRN
Status: DISCONTINUED | OUTPATIENT
Start: 2024-10-28 | End: 2024-10-31 | Stop reason: HOSPADM

## 2024-10-28 RX ORDER — ACETAMINOPHEN 325 MG/1
650 TABLET ORAL EVERY 6 HOURS PRN
Status: DISCONTINUED | OUTPATIENT
Start: 2024-10-28 | End: 2024-10-31 | Stop reason: HOSPADM

## 2024-10-28 RX ORDER — MECOBALAMIN 5000 MCG
5 TABLET,DISINTEGRATING ORAL NIGHTLY PRN
Status: DISCONTINUED | OUTPATIENT
Start: 2024-10-28 | End: 2024-10-31 | Stop reason: HOSPADM

## 2024-10-28 RX ORDER — CALCIUM CARBONATE 500 MG/1
500 TABLET, CHEWABLE ORAL 3 TIMES DAILY PRN
Status: DISCONTINUED | OUTPATIENT
Start: 2024-10-28 | End: 2024-10-31 | Stop reason: HOSPADM

## 2024-10-28 RX ORDER — SODIUM CHLORIDE 9 MG/ML
INJECTION, SOLUTION INTRAVENOUS PRN
Status: DISCONTINUED | OUTPATIENT
Start: 2024-10-28 | End: 2024-10-31 | Stop reason: HOSPADM

## 2024-10-28 RX ORDER — DEXTROSE MONOHYDRATE 100 MG/ML
INJECTION, SOLUTION INTRAVENOUS CONTINUOUS PRN
Status: DISCONTINUED | OUTPATIENT
Start: 2024-10-28 | End: 2024-10-31 | Stop reason: HOSPADM

## 2024-10-28 RX ORDER — ONDANSETRON 2 MG/ML
4 INJECTION INTRAMUSCULAR; INTRAVENOUS ONCE
Status: COMPLETED | OUTPATIENT
Start: 2024-10-28 | End: 2024-10-28

## 2024-10-28 RX ORDER — HYDRALAZINE HYDROCHLORIDE 20 MG/ML
10 INJECTION INTRAMUSCULAR; INTRAVENOUS EVERY 6 HOURS PRN
Status: DISCONTINUED | OUTPATIENT
Start: 2024-10-28 | End: 2024-10-31 | Stop reason: HOSPADM

## 2024-10-28 RX ORDER — SENNOSIDES A AND B 8.6 MG/1
1 TABLET, FILM COATED ORAL NIGHTLY
Status: DISCONTINUED | OUTPATIENT
Start: 2024-10-28 | End: 2024-10-31 | Stop reason: HOSPADM

## 2024-10-28 RX ORDER — CLOPIDOGREL BISULFATE 75 MG/1
75 TABLET ORAL DAILY
Status: DISCONTINUED | OUTPATIENT
Start: 2024-10-28 | End: 2024-10-31 | Stop reason: HOSPADM

## 2024-10-28 RX ORDER — BISACODYL 10 MG
10 SUPPOSITORY, RECTAL RECTAL DAILY PRN
Status: DISCONTINUED | OUTPATIENT
Start: 2024-10-28 | End: 2024-10-31 | Stop reason: HOSPADM

## 2024-10-28 RX ORDER — ATORVASTATIN CALCIUM 20 MG/1
20 TABLET, FILM COATED ORAL DAILY
Status: DISCONTINUED | OUTPATIENT
Start: 2024-10-28 | End: 2024-10-31 | Stop reason: HOSPADM

## 2024-10-28 RX ORDER — ASPIRIN 81 MG/1
81 TABLET ORAL DAILY
Status: DISCONTINUED | OUTPATIENT
Start: 2024-10-28 | End: 2024-10-31 | Stop reason: HOSPADM

## 2024-10-28 RX ORDER — BACLOFEN 10 MG/1
20 TABLET ORAL 3 TIMES DAILY
Status: DISCONTINUED | OUTPATIENT
Start: 2024-10-28 | End: 2024-10-31 | Stop reason: HOSPADM

## 2024-10-28 RX ORDER — IOPAMIDOL 755 MG/ML
75 INJECTION, SOLUTION INTRAVASCULAR
Status: COMPLETED | OUTPATIENT
Start: 2024-10-28 | End: 2024-10-28

## 2024-10-28 RX ORDER — SODIUM CHLORIDE 0.9 % (FLUSH) 0.9 %
5-40 SYRINGE (ML) INJECTION EVERY 12 HOURS SCHEDULED
Status: DISCONTINUED | OUTPATIENT
Start: 2024-10-28 | End: 2024-10-31 | Stop reason: HOSPADM

## 2024-10-28 RX ORDER — SODIUM CHLORIDE 9 MG/ML
INJECTION, SOLUTION INTRAVENOUS CONTINUOUS
Status: ACTIVE | OUTPATIENT
Start: 2024-10-28 | End: 2024-10-29

## 2024-10-28 RX ORDER — ONDANSETRON 2 MG/ML
4 INJECTION INTRAMUSCULAR; INTRAVENOUS EVERY 6 HOURS PRN
Status: DISCONTINUED | OUTPATIENT
Start: 2024-10-28 | End: 2024-10-31 | Stop reason: HOSPADM

## 2024-10-28 RX ORDER — ARFORMOTEROL TARTRATE 15 UG/2ML
15 SOLUTION RESPIRATORY (INHALATION)
Status: DISCONTINUED | OUTPATIENT
Start: 2024-10-28 | End: 2024-10-31 | Stop reason: HOSPADM

## 2024-10-28 RX ORDER — AMLODIPINE BESYLATE 5 MG/1
5 TABLET ORAL DAILY
Status: DISCONTINUED | OUTPATIENT
Start: 2024-10-28 | End: 2024-10-31 | Stop reason: HOSPADM

## 2024-10-28 RX ORDER — SODIUM CHLORIDE 0.9 % (FLUSH) 0.9 %
5-40 SYRINGE (ML) INJECTION PRN
Status: DISCONTINUED | OUTPATIENT
Start: 2024-10-28 | End: 2024-10-31 | Stop reason: HOSPADM

## 2024-10-28 RX ADMIN — IOPAMIDOL 75 ML: 755 INJECTION, SOLUTION INTRAVENOUS at 05:49

## 2024-10-28 RX ADMIN — PANTOPRAZOLE SODIUM 40 MG: 40 INJECTION, POWDER, FOR SOLUTION INTRAVENOUS at 01:51

## 2024-10-28 RX ADMIN — SENNOSIDES 8.6 MG: 8.6 TABLET, FILM COATED ORAL at 20:16

## 2024-10-28 RX ADMIN — BACLOFEN 20 MG: 10 TABLET ORAL at 20:16

## 2024-10-28 RX ADMIN — ONDANSETRON 4 MG: 2 INJECTION INTRAMUSCULAR; INTRAVENOUS at 20:26

## 2024-10-28 RX ADMIN — TRAMADOL HYDROCHLORIDE 50 MG: 50 TABLET, COATED ORAL at 20:16

## 2024-10-28 RX ADMIN — ARFORMOTEROL TARTRATE 15 MCG: 15 SOLUTION RESPIRATORY (INHALATION) at 08:14

## 2024-10-28 RX ADMIN — SODIUM CHLORIDE, PRESERVATIVE FREE 10 ML: 5 INJECTION INTRAVENOUS at 12:14

## 2024-10-28 RX ADMIN — Medication 5 MG: at 18:05

## 2024-10-28 RX ADMIN — SODIUM CHLORIDE, PRESERVATIVE FREE 40 MG: 5 INJECTION INTRAVENOUS at 09:53

## 2024-10-28 RX ADMIN — ONDANSETRON 4 MG: 2 INJECTION INTRAMUSCULAR; INTRAVENOUS at 02:13

## 2024-10-28 RX ADMIN — SODIUM CHLORIDE, PRESERVATIVE FREE 10 ML: 5 INJECTION INTRAVENOUS at 05:49

## 2024-10-28 RX ADMIN — SODIUM CHLORIDE: 9 INJECTION, SOLUTION INTRAVENOUS at 09:52

## 2024-10-28 RX ADMIN — ARFORMOTEROL TARTRATE 15 MCG: 15 SOLUTION RESPIRATORY (INHALATION) at 19:04

## 2024-10-28 RX ADMIN — SODIUM CHLORIDE, PRESERVATIVE FREE 40 MG: 5 INJECTION INTRAVENOUS at 20:16

## 2024-10-28 ASSESSMENT — PAIN - FUNCTIONAL ASSESSMENT
PAIN_FUNCTIONAL_ASSESSMENT: PREVENTS OR INTERFERES SOME ACTIVE ACTIVITIES AND ADLS
PAIN_FUNCTIONAL_ASSESSMENT: NONE - DENIES PAIN

## 2024-10-28 ASSESSMENT — PAIN DESCRIPTION - DESCRIPTORS: DESCRIPTORS: ACHING;DISCOMFORT;SORE

## 2024-10-28 ASSESSMENT — PAIN DESCRIPTION - PAIN TYPE: TYPE: ACUTE PAIN

## 2024-10-28 ASSESSMENT — PAIN DESCRIPTION - FREQUENCY: FREQUENCY: CONTINUOUS

## 2024-10-28 ASSESSMENT — PAIN DESCRIPTION - LOCATION: LOCATION: ABDOMEN

## 2024-10-28 ASSESSMENT — PAIN DESCRIPTION - ORIENTATION: ORIENTATION: MID

## 2024-10-28 ASSESSMENT — PAIN SCALES - GENERAL: PAINLEVEL_OUTOF10: 7

## 2024-10-28 ASSESSMENT — PAIN DESCRIPTION - ONSET: ONSET: ON-GOING

## 2024-10-28 NOTE — PROGRESS NOTES
New dressing, split type placed on PEG tube area.     Has a 150 cc output, brown black in color from the attached bag on PEG.

## 2024-10-28 NOTE — H&P
Hospitalist History & Physical      PCP: Abhijit Condon MD    Date of Service: Pt seen/examined on 10/28/2024     Chief Complaint:  had concerns including Vomiting (Large amount coffee ground emesis. No abd pain. Pt is on plavix. ).    History of Present Illness:    Mr. Kirt May, a 74 y.o. year old male  who  has a past medical history of Benign prostate hyperplasia, Cerebral artery occlusion with cerebral infarction (HCC), Diabetes mellitus (HCC), Dysphagia, Hemiplegia (HCC), Hyperlipidemia, Hypertension, and Urinary incontinence.     Patient presented to the ED from nursing home for evaluation after 1 occurrence of coffee-ground emesis. He is a questionable historian and HPI is supplemented by review of EMR and paperwork provided by senior living. He reports not feeling well x 1 day with a cough, nausea, and diarrhea.  Of note he does have a PEG tube which was placed in 6/24 for malnutrition. He denies any abdominal pain, chest pain, shortness of breath, fevers, chills.  He is not on blood thinners but is on Plavix.    ER COURSE:  In ED he was tachycardic which improved with IV fluids.  Vitals otherwise stable.  Laboratory workup significant for lactic acidosis.  H&H stable.  CTAP with evidence of esophagitis, stomach distention, and moderate stool burden.  He was given Protonix and Zofran in ED.    PREVIOUS HOSPITALIZATION:  Admission from 6/23 - 6/28 reviewed.  Admitted after being found down at home during a welfare check.  Left-sided flaccidity on admission.  Found to have a large right MCA CVA.      Past Medical History:        Diagnosis Date    Benign prostate hyperplasia     Cerebral artery occlusion with cerebral infarction (HCC)     Diabetes mellitus (HCC)     Dysphagia     Hemiplegia (HCC)     Hyperlipidemia     Hypertension     Urinary incontinence        Past Surgical History:        Procedure Laterality Date    UPPER GASTROINTESTINAL ENDOSCOPY N/A 6/4/2024    ESOPHAGOGASTRODUODENOSCOPY  deformities.   Skin: Normal skin color.  No rashes or lesions. Good turgor.   Neurologic:  Awake, alert, following commands as able. L sided flaccidity       CBC:   Recent Labs     10/28/24  0142 10/28/24  0508   WBC 10.0  --    RBC 4.64  --    HGB 14.2 14.3   HCT 44.1 44.3   MCV 95.0  --    RDW 14.7  --      --      BMP:   Recent Labs     10/28/24  0142      K 4.1   CL 98   CO2 28   BUN 21   CREATININE 0.9     LFT:  Recent Labs     10/28/24  0142   ALKPHOS 110   ALT 14   AST 25   BILITOT 0.4   LIPASE 15       Lactic Acid:   Lab Results   Component Value Date    LACTA 2.4 (H) 10/28/2024       Oupatient labs:  Lab Results   Component Value Date    CHOL 190 06/24/2023    TRIG 104 06/24/2023    HDL 53 09/05/2023    PSA 0.74 10/12/2015    INR 1.1 06/23/2023    LABA1C 5.8 (H) 12/14/2023       Urinalysis:    Lab Results   Component Value Date/Time    NITRU NEGATIVE 10/28/2024 01:53 AM    WBCUA 0 TO 5 10/28/2024 01:53 AM    RBCUA 0 TO 2 10/28/2024 01:53 AM    BLOODU neg 08/11/2015 10:53 AM    SPECGRAV 1.030 08/11/2015 10:53 AM    GLUCOSEU NEGATIVE 10/28/2024 01:53 AM       Imaging:  CT ABDOMEN PELVIS W IV CONTRAST Additional Contrast? None    Result Date: 10/28/2024  1. No acute intra-abdominal or pelvic process. 2. Fluid/secretions noted within left lower lobe bronchioles. 3. Thickening of the distal esophageal wall may reflect esophagitis. 4. Percutaneous gastrostomy tube. The stomach is distended. 5. Moderate stool within the rectal sigmoid colon.     XR ABDOMEN FOR NG/OG/NE TUBE PLACEMENT    Result Date: 10/14/2024  Satisfactory position of the feeding tube tip as evidenced by stomach contour seen following injection of a radiopaque oral contrast material.         Assessment and Plan:    Principal Problem:    Coffee ground emesis  Active Problems:    GIB (gastrointestinal bleeding)    Lactic acidosis    Constipation  Resolved Problems:    * No resolved hospital problems. *    Discussed patient's case with

## 2024-10-28 NOTE — ED PROVIDER NOTES
[facility] EMERGENCY DEPARTMENT  EMERGENCY DEPARTMENT ENCOUNTER    Pt Name: Kirt May  MRN: [id number]  Birthdate 1950  Date of evaluation: 10/28/2024  Provider: Johnny Miller MD  PCP: Abhijit Condon MD  Note Started: 1:22 AM EDT 10/28/24    HPI     Patient is a 74 y.o. male presents with a chief complaint of   Chief Complaint   Patient presents with    Vomiting     Large amount coffee ground emesis. No abd pain. Pt is on plavix.    .    Patient presents for emesis.  Patient reportedly had 1 episode of coffee-ground emesis.  Patient is alert and oriented x 2 with no complaints at this time.  Patient denies any chest pain or shortness of breath.  Does admit to 1 episode of coffee-ground emesis.  Patient is on Plavix.    Nursing Notes were all reviewed and agreed with or any disagreements were addressed in the HPI.    History From: Patient    Review of Systems   Pertinent positives and negatives as per HPI.     Physical Exam  Vitals and nursing note reviewed.   Constitutional:       Appearance: He is well-developed.   HENT:      Head: Normocephalic and atraumatic.   Eyes:      Conjunctiva/sclera: Conjunctivae normal.   Cardiovascular:      Rate and Rhythm: Normal rate and regular rhythm.      Heart sounds: Normal heart sounds. No murmur heard.  Pulmonary:      Effort: Pulmonary effort is normal. No respiratory distress.      Breath sounds: Normal breath sounds. No wheezing or rales.   Abdominal:      General: Bowel sounds are normal.      Palpations: Abdomen is soft.      Tenderness: There is no abdominal tenderness. There is no guarding or rebound.   Musculoskeletal:         General: No tenderness or deformity.      Cervical back: Normal range of motion and neck supple.   Skin:     General: Skin is warm and dry.      Comments: Pulses in the bilateral feet with no large wounds   Neurological:      Mental Status: He is alert and oriented to person, place, and time.  Consent: The risks of atrophy were reviewed with the patient. Granulocytes Absolute 0.04 0.00 - 0.58 k/uL   CMP   Result Value Ref Range    Sodium 139 132 - 146 mmol/L    Potassium 4.1 3.5 - 5.0 mmol/L    Chloride 98 98 - 107 mmol/L    CO2 28 22 - 29 mmol/L    Anion Gap 13 7 - 16 mmol/L    Glucose 115 (H) 74 - 99 mg/dL    BUN 21 6 - 23 mg/dL    Creatinine 0.9 0.70 - 1.20 mg/dL    Est, Glom Filt Rate >90 >60 mL/min/1.73m2    Calcium 9.8 8.6 - 10.2 mg/dL    Total Protein 8.5 (H) 6.4 - 8.3 g/dL    Albumin 3.9 3.5 - 5.2 g/dL    Total Bilirubin 0.4 0.0 - 1.2 mg/dL    Alkaline Phosphatase 110 40 - 129 U/L    ALT 14 0 - 40 U/L    AST 25 0 - 39 U/L   Lactic Acid   Result Value Ref Range    Lactic Acid 2.4 (H) 0.5 - 2.2 mmol/L   Lipase   Result Value Ref Range    Lipase 15 13 - 60 U/L   Urinalysis with Microscopic   Result Value Ref Range    Color, UA Yellow Yellow    Turbidity UA Clear Clear    Glucose, Ur NEGATIVE NEGATIVE mg/dL    Bilirubin, Urine NEGATIVE NEGATIVE    Ketones, Urine NEGATIVE NEGATIVE mg/dL    Specific Gravity, UA 1.015 1.005 - 1.030    Urine Hgb NEGATIVE NEGATIVE    pH, Urine 6.0 5.0 - 9.0    Protein, UA NEGATIVE NEGATIVE mg/dL    Urobilinogen, Urine 1.0 0.0 - 1.0 EU/dL    Nitrite, Urine NEGATIVE NEGATIVE    Leukocyte Esterase, Urine NEGATIVE NEGATIVE    WBC, UA 0 TO 5 0 TO 5 /HPF    RBC, UA 0 TO 2 0 TO 2 /HPF    Crystals, UA FEW CALCIUM OXALATE (A) None /HPF   Hemoglobin and Hematocrit   Result Value Ref Range    Hemoglobin 14.3 12.5 - 16.5 g/dL    Hematocrit 44.3 37.0 - 54.0 %   TYPE AND SCREEN   Result Value Ref Range    Blood Bank Sample Expiration 10/31/2024,2359     Arm Band Number [id number]     ABO/Rh B POSITIVE     Antibody Screen NEGATIVE        RADIOLOGY:  CT ABDOMEN PELVIS W IV CONTRAST Additional Contrast? None   Final Result   1. No acute intra-abdominal or pelvic process.   2. Fluid/secretions noted within left lower lobe bronchioles.   3. Thickening of the distal esophageal wall may reflect esophagitis.   4. Percutaneous gastrostomy tube. The stomach is

## 2024-10-28 NOTE — PROGRESS NOTES
Patient AO x 3 unsure about some of his answers on admitting questions being asked. Home medications was not reviewed.

## 2024-10-28 NOTE — PROGRESS NOTES
4 Eyes Skin Assessment     NAME:  Kirt May  YOB: 1950  MEDICAL RECORD NUMBER:  [id number]    The patient is being assessed for  Admission    I agree that at least one RN has performed a thorough Head to Toe Skin Assessment on the patient. ALL assessment sites listed below have been assessed.      Areas assessed by both nurses:    Head, Face, Ears, Shoulders, Back, Chest, Arms, Elbows, Hands, Sacrum. Buttock, Coccyx, Ischium, Legs. Feet and Heels, and Under Medical Devices         Does the Patient have a Wound? No noted wound(s)       Case Prevention initiated by RN: Yes  Wound Care Orders initiated by RN: No    Pressure Injury (Stage 3,4, Unstageable, DTI, NWPT, and Complex wounds) if present, place Wound referral order by RN under : No    New Ostomies, if present place, Ostomy referral order under : No     Nurse 1 eSignature: Electronically signed by Marisel Miner RN on 10/28/24 at 12:51 PM EDT    **SHARE this note so that the co-signing nurse can place an eSignature**    Nurse 2 eSignature: Electronically signed by Sandy Kaiser RN on 10/28/24 at 1:14 PM EDT

## 2024-10-29 ENCOUNTER — ANESTHESIA (OUTPATIENT)
Dept: ENDOSCOPY | Age: 74
End: 2024-10-29
Payer: MEDICARE

## 2024-10-29 ENCOUNTER — ANESTHESIA EVENT (OUTPATIENT)
Dept: ENDOSCOPY | Age: 74
End: 2024-10-29
Payer: MEDICARE

## 2024-10-29 LAB
ANION GAP SERPL CALCULATED.3IONS-SCNC: 12 MMOL/L (ref 7–16)
BUN SERPL-MCNC: 26 MG/DL (ref 6–23)
CALCIUM SERPL-MCNC: 9 MG/DL (ref 8.6–10.2)
CHLORIDE SERPL-SCNC: 108 MMOL/L (ref 98–107)
CO2 SERPL-SCNC: 26 MMOL/L (ref 22–29)
CREAT SERPL-MCNC: 0.7 MG/DL (ref 0.7–1.2)
GFR, ESTIMATED: >90 ML/MIN/1.73M2
GLUCOSE BLD-MCNC: 104 MG/DL (ref 74–99)
GLUCOSE BLD-MCNC: 105 MG/DL (ref 74–99)
GLUCOSE BLD-MCNC: 110 MG/DL (ref 74–99)
GLUCOSE BLD-MCNC: 75 MG/DL (ref 74–99)
GLUCOSE BLD-MCNC: 84 MG/DL (ref 74–99)
GLUCOSE SERPL-MCNC: 102 MG/DL (ref 74–99)
HCT VFR BLD AUTO: 33.8 % (ref 37–54)
HCT VFR BLD AUTO: 35.1 % (ref 37–54)
HCT VFR BLD AUTO: 35.3 % (ref 37–54)
HGB BLD-MCNC: 10.7 G/DL (ref 12.5–16.5)
HGB BLD-MCNC: 11 G/DL (ref 12.5–16.5)
HGB BLD-MCNC: 11.3 G/DL (ref 12.5–16.5)
INR PPP: 1.2
IRON SATN MFR SERPL: 8 % (ref 20–55)
IRON SERPL-MCNC: 35 UG/DL (ref 59–158)
PARTIAL THROMBOPLASTIN TIME: 27.5 SEC (ref 24.5–35.1)
POTASSIUM SERPL-SCNC: 3.8 MMOL/L (ref 3.5–5)
PROTHROMBIN TIME: 13.4 SEC (ref 9.3–12.4)
SODIUM SERPL-SCNC: 146 MMOL/L (ref 132–146)
TIBC SERPL-MCNC: 190 UG/DL (ref 250–450)

## 2024-10-29 PROCEDURE — 96376 TX/PRO/DX INJ SAME DRUG ADON: CPT

## 2024-10-29 PROCEDURE — 82962 GLUCOSE BLOOD TEST: CPT

## 2024-10-29 PROCEDURE — 99222 1ST HOSP IP/OBS MODERATE 55: CPT | Performed by: STUDENT IN AN ORGANIZED HEALTH CARE EDUCATION/TRAINING PROGRAM

## 2024-10-29 PROCEDURE — G0378 HOSPITAL OBSERVATION PER HR: HCPCS

## 2024-10-29 PROCEDURE — 94640 AIRWAY INHALATION TREATMENT: CPT

## 2024-10-29 PROCEDURE — 85018 HEMOGLOBIN: CPT

## 2024-10-29 PROCEDURE — 99222 1ST HOSP IP/OBS MODERATE 55: CPT | Performed by: NURSE PRACTITIONER

## 2024-10-29 PROCEDURE — 3609017100 HC EGD: Performed by: STUDENT IN AN ORGANIZED HEALTH CARE EDUCATION/TRAINING PROGRAM

## 2024-10-29 PROCEDURE — 85730 THROMBOPLASTIN TIME PARTIAL: CPT

## 2024-10-29 PROCEDURE — 3700000000 HC ANESTHESIA ATTENDED CARE: Performed by: STUDENT IN AN ORGANIZED HEALTH CARE EDUCATION/TRAINING PROGRAM

## 2024-10-29 PROCEDURE — 0DJ08ZZ INSPECTION OF UPPER INTESTINAL TRACT, VIA NATURAL OR ARTIFICIAL OPENING ENDOSCOPIC: ICD-10-PCS | Performed by: STUDENT IN AN ORGANIZED HEALTH CARE EDUCATION/TRAINING PROGRAM

## 2024-10-29 PROCEDURE — 2580000003 HC RX 258: Performed by: NURSE ANESTHETIST, CERTIFIED REGISTERED

## 2024-10-29 PROCEDURE — 6360000002 HC RX W HCPCS: Performed by: NURSE PRACTITIONER

## 2024-10-29 PROCEDURE — 85014 HEMATOCRIT: CPT

## 2024-10-29 PROCEDURE — 3700000001 HC ADD 15 MINUTES (ANESTHESIA): Performed by: STUDENT IN AN ORGANIZED HEALTH CARE EDUCATION/TRAINING PROGRAM

## 2024-10-29 PROCEDURE — 80048 BASIC METABOLIC PNL TOTAL CA: CPT

## 2024-10-29 PROCEDURE — 36415 COLL VENOUS BLD VENIPUNCTURE: CPT

## 2024-10-29 PROCEDURE — 2700000000 HC OXYGEN THERAPY PER DAY

## 2024-10-29 PROCEDURE — 2709999900 HC NON-CHARGEABLE SUPPLY: Performed by: STUDENT IN AN ORGANIZED HEALTH CARE EDUCATION/TRAINING PROGRAM

## 2024-10-29 PROCEDURE — 6360000002 HC RX W HCPCS: Performed by: NURSE ANESTHETIST, CERTIFIED REGISTERED

## 2024-10-29 PROCEDURE — 6370000000 HC RX 637 (ALT 250 FOR IP): Performed by: NURSE PRACTITIONER

## 2024-10-29 PROCEDURE — 43235 EGD DIAGNOSTIC BRUSH WASH: CPT | Performed by: STUDENT IN AN ORGANIZED HEALTH CARE EDUCATION/TRAINING PROGRAM

## 2024-10-29 PROCEDURE — 85610 PROTHROMBIN TIME: CPT

## 2024-10-29 PROCEDURE — 99232 SBSQ HOSP IP/OBS MODERATE 35: CPT

## 2024-10-29 PROCEDURE — 2580000003 HC RX 258: Performed by: NURSE PRACTITIONER

## 2024-10-29 RX ORDER — SODIUM CHLORIDE 9 MG/ML
INJECTION, SOLUTION INTRAVENOUS
Status: DISCONTINUED | OUTPATIENT
Start: 2024-10-29 | End: 2024-10-29 | Stop reason: SDUPTHER

## 2024-10-29 RX ORDER — PROPOFOL 10 MG/ML
INJECTION, EMULSION INTRAVENOUS
Status: DISCONTINUED | OUTPATIENT
Start: 2024-10-29 | End: 2024-10-29 | Stop reason: SDUPTHER

## 2024-10-29 RX ADMIN — ARFORMOTEROL TARTRATE 15 MCG: 15 SOLUTION RESPIRATORY (INHALATION) at 06:40

## 2024-10-29 RX ADMIN — SODIUM CHLORIDE, PRESERVATIVE FREE 10 ML: 5 INJECTION INTRAVENOUS at 21:24

## 2024-10-29 RX ADMIN — SODIUM CHLORIDE, PRESERVATIVE FREE 40 MG: 5 INJECTION INTRAVENOUS at 21:22

## 2024-10-29 RX ADMIN — PROPOFOL 50 MG: 10 INJECTION, EMULSION INTRAVENOUS at 17:46

## 2024-10-29 RX ADMIN — BACLOFEN 20 MG: 10 TABLET ORAL at 13:42

## 2024-10-29 RX ADMIN — ATORVASTATIN CALCIUM 20 MG: 20 TABLET, FILM COATED ORAL at 08:01

## 2024-10-29 RX ADMIN — SODIUM CHLORIDE, PRESERVATIVE FREE 40 MG: 5 INJECTION INTRAVENOUS at 08:02

## 2024-10-29 RX ADMIN — LOSARTAN POTASSIUM 50 MG: 50 TABLET, FILM COATED ORAL at 21:22

## 2024-10-29 RX ADMIN — ARFORMOTEROL TARTRATE 15 MCG: 15 SOLUTION RESPIRATORY (INHALATION) at 20:20

## 2024-10-29 RX ADMIN — SODIUM CHLORIDE: 9 INJECTION, SOLUTION INTRAVENOUS at 17:40

## 2024-10-29 RX ADMIN — TRAMADOL HYDROCHLORIDE 50 MG: 50 TABLET, COATED ORAL at 21:22

## 2024-10-29 RX ADMIN — SENNOSIDES 8.6 MG: 8.6 TABLET, FILM COATED ORAL at 21:23

## 2024-10-29 RX ADMIN — BACLOFEN 20 MG: 10 TABLET ORAL at 21:22

## 2024-10-29 RX ADMIN — BACLOFEN 20 MG: 10 TABLET ORAL at 08:01

## 2024-10-29 RX ADMIN — SODIUM CHLORIDE, PRESERVATIVE FREE 10 ML: 5 INJECTION INTRAVENOUS at 08:02

## 2024-10-29 ASSESSMENT — PAIN DESCRIPTION - ORIENTATION: ORIENTATION: LOWER;MID

## 2024-10-29 ASSESSMENT — PAIN SCALES - GENERAL
PAINLEVEL_OUTOF10: 0
PAINLEVEL_OUTOF10: 0
PAINLEVEL_OUTOF10: 7
PAINLEVEL_OUTOF10: 4
PAINLEVEL_OUTOF10: 0

## 2024-10-29 ASSESSMENT — PAIN DESCRIPTION - ONSET: ONSET: ON-GOING

## 2024-10-29 ASSESSMENT — PAIN DESCRIPTION - LOCATION: LOCATION: ABDOMEN

## 2024-10-29 ASSESSMENT — PAIN - FUNCTIONAL ASSESSMENT: PAIN_FUNCTIONAL_ASSESSMENT: PREVENTS OR INTERFERES SOME ACTIVE ACTIVITIES AND ADLS

## 2024-10-29 ASSESSMENT — PAIN DESCRIPTION - DESCRIPTORS: DESCRIPTORS: ACHING;DISCOMFORT;SORE

## 2024-10-29 ASSESSMENT — LIFESTYLE VARIABLES: SMOKING_STATUS: 1

## 2024-10-29 ASSESSMENT — PAIN DESCRIPTION - FREQUENCY: FREQUENCY: CONTINUOUS

## 2024-10-29 ASSESSMENT — PAIN DESCRIPTION - PAIN TYPE: TYPE: ACUTE PAIN

## 2024-10-29 NOTE — ACP (ADVANCE CARE PLANNING)
10/29/24, Patient admitted for Coffee Grounds Emesis.  Patient only alert x 2.  Call placed to Abiel LOCK of patient.  Discussed transition of care/SW role.  Patient is from [facility] where patient currently resides.  Per Abiel patient would return to [facility] upon Discharge.  Abiel mentioned that he is in process of having papers signed to have guardianship that he has signed over to the [facility] next to [facility].  Abiel is doing this due to his own health issues.  Call placed to Raquel Pierce on patient who resides at [facility].  Patient is a long term resident and can return to the facility when discharged.  Ambulance form (will need completed on day of discharge), face sheet and envelope is on soft chart.  SW to follow.      Case Management Assessment  Initial Evaluation    Date/Time of Evaluation: 10/29/2024 1:56 PM  Assessment Completed by: NITZA Martinez    If patient is discharged prior to next notation, then this note serves as note for discharge by case management.    Patient Name: Kirt May                   YOB: 1950  Diagnosis: Tachycardia [R00.0];Coffee ground emesis [K92.0]                   Date / Time: 10/28/2024  1:17 AM    Patient Admission Status: Observation   Readmission Risk (Low < 19, Mod (19-27), High > 27): No data recorded  Current PCP: Abhijit Condon MD  PCP verified by ? Yes    Chart Reviewed: Yes      History Provided by: Friend  Patient Orientation: Other (see comment) (Spoke with ASHVIN Beebe since patient only alert x 2.)    Patient Cognition: Other (see comment) (PerFrank patient gets confused at times.  Patient alert x 2)    Hospitalization in the last 30 days (Readmission):  No    If yes, Readmission Assessment in  Navigator will be completed.    Advance Directives:      Code Status: Full Code   Patient's Primary Decision Maker is:      Primary Decision Maker: Abiel Rodriguez - Other - [phone]    Discharge  Planning:    Patient lives with:   Type of Home:    Primary Care Giver: Other (Comment) (Patient is from [facility])  Patient Support Systems include: Family Members, Friends/Neighbors   Current Financial resources:    Current community resources:    Current services prior to admission:              Current DME:              Type of Home Care services:       ADLS  Prior functional level: Assistance with the following:, Cooking, Bathing, Housework, Shopping, Mobility  Current functional level: Assistance with the following:, Bathing, Cooking, Housework, Shopping, Mobility    PT AM-PAC:   /24  OT AM-PAC:   /24    Family can provide assistance at DC: No  Would you like Case Management to discuss the discharge plan with any other family members/significant others, and if so, who? Yes (Abiel LOCK)  Plans to Return to Present Housing: Yes  Other Identified Issues/Barriers to RETURNING to current housing: N/A  Potential Assistance needed at discharge:              Potential DME:    Patient expects to discharge to:    Plan for transportation at discharge:      Financial    Payor: [facility] MEDICARE / Plan: Zero[employer] DUAL COMPLETE / Product Type: *No Product type* /     Does insurance require precert for SNF: Yes    Potential assistance Purchasing Medications:    Meds-to-Beds request: Yes      [facility] [place], [place] - [address]  - P [phone] - F [phone]  [address] [facility] [address]  Phone: [phone] Fax: [phone]    [facility] [address] -  [phone] - F [phone]  [address] [facility] [address]  Phone: [phone] Fax: [phone]    [facility] - [place], [place] - [address] - P [phone] - F [phone]  [address] [facility] [address]  [address]  [place] [id number]  Phone: [phone] Fax: [phone]      Notes:    Factors facilitating achievement of predicted outcomes: Friend support    Barriers  to discharge: N/A    Additional Case Management Notes: See Above    The Plan for Transition of Care is related to the following treatment goals of Tachycardia [R00.0];Coffee ground emesis [K92.0]    IF APPLICABLE: The Patient and/or patient representative Kirt and his family were provided with a choice of provider and agrees with the discharge plan. Freedom of choice list with basic dialogue that supports the patient's individualized plan of care/goals and shares the quality data associated with the providers was provided to:     Patient Representative Name:       The Patient and/or Patient Representative Agree with the Discharge Plan?      NITZA Martinez  Case Management Department  Ph: [phone] Fax: N/A

## 2024-10-29 NOTE — BRIEF OP NOTE
Brief Postoperative Note      Patient: Kirt May  YOB: 1950  MRN: [id number]    Date of Procedure: 10/29/2024    Pre-Op Diagnosis Codes:      * Coffee ground emesis [K92.0]    Post-Op Diagnosis: Normal exam       Procedure(s):  ESOPHAGOGASTRODUODENOSCOPY    Surgeon(s):  Augusto Myrick MD    Assistant:  * No surgical staff found *    Anesthesia: Monitor Anesthesia Care    Estimated Blood Loss (mL): less than 50     Complications: None    Specimens:   * No specimens in log *    Implants:  * No implants in log *      Drains:   Gastrostomy/Enterostomy/Jejunostomy Tube Percutaneous Endoscopic Gastrostomy (PEG) LUQ 20 fr (Active)   Drainage Appearance Brown 10/29/24 1041   Site Description Clean, dry & intact 10/29/24 1041   J Port Status Open to gravity drainage 10/29/24 0930   Surrounding Skin Clean, dry & intact 10/29/24 1041   Dressing Status New dressing applied 10/28/24 1203   Output (mL) 25 ml 10/29/24 0514       External Urinary Catheter (Active)   Site Assessment Clean,dry & intact 10/28/24 1245   Placement Initiated 10/28/24 1245   Perineal Care Yes 10/28/24 1245   Suction 40 mmgHg continuous 10/29/24 0513   Urine Color Daysi 10/29/24 0513   Urine Appearance Clear 10/29/24 0513   Output (mL) 400 mL 10/29/24 0513       Findings:    Normal exam.  No high risk lesions.   PEG in place.     Electronically signed by Augusto Myrick MD on 10/29/2024 at 7:05 PM

## 2024-10-29 NOTE — ANESTHESIA POSTPROCEDURE EVALUATION
Department of Anesthesiology  Postprocedure Note    Patient: Kirt May  MRN: [id number]  YOB: 1950  Date of evaluation: 10/29/2024    Procedure Summary       Date: 10/29/24 Room / Location: [place] [address] / [facility]    Anesthesia Start: 1740 Anesthesia Stop: 1756    Procedure: ESOPHAGOGASTRODUODENOSCOPY Diagnosis:       Coffee ground emesis      (Coffee ground emesis [K92.0])    Surgeons: Augusto Myrick MD Responsible Provider: Anais Byrd MD    Anesthesia Type: MAC ASA Status: Not recorded            Anesthesia Type: MAC    Patricia Phase I: Patricia Score: 6    Patricia Phase II:      Anesthesia Post Evaluation    Patient location during evaluation: PACU  Patient participation: complete - patient participated  Level of consciousness: awake  Pain score: 0  Airway patency: patent  Nausea & Vomiting: no nausea  Cardiovascular status: hemodynamically stable  Respiratory status: acceptable  Hydration status: stable    No notable events documented.

## 2024-10-29 NOTE — ACP (ADVANCE CARE PLANNING)
10/29/24, Advance Care Planning   Healthcare Decision Maker:    Primary Decision Maker: Abiel Rodriguez [facility] - [phone]    Click here to complete Healthcare Decision Makers including selection of the Healthcare Decision Maker Relationship (ie \"Primary\").

## 2024-10-29 NOTE — ANESTHESIA PRE PROCEDURE
Department of Anesthesiology  Preprocedure Note       Name:  Kirt May   Age:  74 y.o.  :  1950                                          MRN:  [id number]         Date:  10/29/2024      Surgeon: Surgeon(s):  Augusto Myrick MD    Procedure: Procedure(s):  ESOPHAGOGASTRODUODENOSCOPY    Medications prior to admission:   Prior to Admission medications    Medication Sig Start Date End Date Taking? Authorizing Provider   baclofen (LIORESAL) 20 MG tablet Take 1 tablet by mouth 3 times daily 24   Nii Ceron APRN - CNS   acetaminophen (TYLENOL) 325 MG tablet Take 2 tablets by mouth every 6 hours as needed for Pain    ProviderUyen MD   bisacodyl (DULCOLAX) 10 MG suppository Place 1 suppository rectally daily as needed for Constipation    ProviderUyen MD   polyethylene glycol (GLYCOLAX) 17 g packet Take 1 packet by mouth daily as needed for Constipation    ProviderUyen MD   magnesium hydroxide (MILK OF MAGNESIA) 400 MG/5ML suspension Take 30 mLs by mouth daily as needed for Constipation    ProviderUyen MD   traMADol (ULTRAM) 50 MG tablet Take 1 tablet by mouth every 8 hours as needed for Pain.    ProviderUyen MD   losartan (COZAAR) 50 MG tablet Take 1 tablet by mouth in the morning and at bedtime 23   Kirt Faith, APRN - CNP   amLODIPine (NORVASC) 5 MG tablet Take 1 tablet by mouth daily 23   Kirt Faith, APRN - CNP   clopidogrel (PLAVIX) 75 MG tablet Take 1 tablet by mouth daily 23   Kirt Faith, APRN - CNP   aspirin 81 MG EC tablet Take 1 tablet by mouth daily    Uyen Escalante MD   metFORMIN (GLUCOPHAGE) 500 MG tablet Take 1 tablet by mouth 2 times daily (with meals)    Uyen Escalante MD   Multiple Vitamin (MULTI-DAY VITAMINS) TABS Take 1 tablet by mouth daily    Uyen Escalante MD   salmeterol (SEREVENT DISKUS) 50 MCG/ACT AEPB diskus inhaler Inhale 1 puff into the lungs in the morning and at bedtime

## 2024-10-29 NOTE — PROGRESS NOTES
PCP: Abhijit Condon MD    Date of Service: Pt seen/examined on 10/29/2024     Chief Complaint:  had concerns including Vomiting (Large amount coffee ground emesis. No abd pain. Pt is on plavix. ).    History of Present Illness:    Mr. Kirt May, a 74 y.o. year old male  who  has a past medical history of Benign prostate hyperplasia, Cerebral artery occlusion with cerebral infarction (HCC), Diabetes mellitus (HCC), Dysphagia, Hemiplegia (HCC), Hyperlipidemia, Hypertension, and Urinary incontinence.     Patient presented to the ED from nursing home for evaluation after 1 occurrence of coffee-ground emesis. He is a questionable historian and HPI is supplemented by review of EMR and paperwork provided by [facility]. He reports not feeling well x 1 day with a cough, nausea, and diarrhea.  Of note he does have a PEG tube which was placed in 6/24 for malnutrition. He denies any abdominal pain, chest pain, shortness of breath, fevers, chills.  He is not on blood thinners but is on Plavix.    ER COURSE:  In ED he was tachycardic which improved with IV fluids.  Vitals otherwise stable.  Laboratory workup significant for lactic acidosis.  H&H stable.  CTAP with evidence of esophagitis, stomach distention, and moderate stool burden.  He was given Protonix and Zofran in ED.    PREVIOUS HOSPITALIZATION:  Admission from 6/23 - 6/28 reviewed.  Admitted after being found down at home during a welfare check.  Left-sided flaccidity on admission.  Found to have a large right MCA CVA.      Past Medical History:        Diagnosis Date    Benign prostate hyperplasia     Cerebral artery occlusion with cerebral infarction (HCC)     Diabetes mellitus (HCC)     Dysphagia     Hemiplegia (HCC)     Hyperlipidemia     Hypertension     Urinary incontinence        Past Surgical History:        Procedure Laterality Date    UPPER GASTROINTESTINAL ENDOSCOPY N/A 6/4/2024    ESOPHAGOGASTRODUODENOSCOPY PERCUTANEOUS ENDOSCOPIC GASTROSTOMY TUBE  lesions. Good turgor.   Neurologic:  Awake, alert, following commands as able. L sided flaccidity       CBC:   Recent Labs     10/28/24  0142 10/28/24  0508 10/28/24  1718 10/29/24  0005 10/29/24  0612   WBC 10.0  --   --   --   --    RBC 4.64  --   --   --   --    HGB 14.2   < > 13.0 11.3* 10.7*   HCT 44.1   < > 41.1 35.3* 33.8*   MCV 95.0  --   --   --   --    RDW 14.7  --   --   --   --      --   --   --   --     < > = values in this interval not displayed.     BMP:   Recent Labs     10/28/24  0142 10/28/24  0852 10/29/24  0612     --  146   K 4.1  --  3.8   CL 98  --  108*   CO2 28  --  26   BUN 21  --  26*   CREATININE 0.9  --  0.7   MG  --  1.6  --    PHOS  --  3.2  --      LFT:  Recent Labs     10/28/24  0142   ALKPHOS 110   ALT 14   AST 25   BILITOT 0.4   LIPASE 15       Lactic Acid:   Lab Results   Component Value Date    LACTA 1.5 10/28/2024       Oupatient labs:  Lab Results   Component Value Date    CHOL 101 10/28/2024    TRIG 58 10/28/2024    HDL 41 10/28/2024    TSH 1.21 10/28/2024    PSA 0.74 10/12/2015    INR 1.2 10/29/2024    LABA1C 5.8 (H) 12/14/2023       Urinalysis:    Lab Results   Component Value Date/Time    NITRU NEGATIVE 10/28/2024 01:53 AM    WBCUA 0 TO 5 10/28/2024 01:53 AM    RBCUA 0 TO 2 10/28/2024 01:53 AM    BLOODU neg 08/11/2015 10:53 AM    SPECGRAV 1.030 08/11/2015 10:53 AM    GLUCOSEU NEGATIVE 10/28/2024 01:53 AM       Imaging:  CT ABDOMEN PELVIS W IV CONTRAST Additional Contrast? None    Result Date: 10/28/2024  1. No acute intra-abdominal or pelvic process. 2. Fluid/secretions noted within left lower lobe bronchioles. 3. Thickening of the distal esophageal wall may reflect esophagitis. 4. Percutaneous gastrostomy tube. The stomach is distended. 5. Moderate stool within the rectal sigmoid colon.     XR ABDOMEN FOR NG/OG/NE TUBE PLACEMENT    Result Date: 10/14/2024  Satisfactory position of the feeding tube tip as evidenced by stomach contour seen following injection of

## 2024-10-29 NOTE — PROGRESS NOTES
The patient was quiet. We had a short visit - I assured him of our support and prayer.  If you need additional support, you may reach out to us at [facility], x [phone].     Abran Glaser; SHELLY Young

## 2024-10-29 NOTE — DISCHARGE INSTR - COC
Continuity of Care Form    Patient Name: Kirt May   :  1950  MRN:  [id number]    Admit date:  10/28/2024  Discharge date:  10/31/24    Code Status Order: Full Code   Advance Directives:   Advance Care Flowsheet Documentation             Admitting Physician:  Santo Odell MD  PCP: Abhijit Condon MD    Discharging Nurse: YVETTE Joiner   Discharging Hospital Unit/Room#: 8401/8401-A  Discharging Unit Phone Number: [phone]    Emergency Contact:   Extended Emergency Contact Information  Primary Emergency Contact: Abiel Rodriguez  Home Phone: [phone]  Mobile Phone: [phone]  Relation: Other    Past Surgical History:  Past Surgical History:   Procedure Laterality Date    UPPER GASTROINTESTINAL ENDOSCOPY N/A 2024    ESOPHAGOGASTRODUODENOSCOPY PERCUTANEOUS ENDOSCOPIC GASTROSTOMY TUBE PLACEMENT             +++FACILITY+++ performed by Mohsen Mejia MD at [facility] ENDOSCOPY       Immunization History:   Immunization History   Administered Date(s) Administered    Influenza Virus Vaccine 10/12/2015    Pneumococcal, PPSV23, PNEUMOVAX 23, (age 2y+), SC/IM, 0.5mL 10/12/2015       Active Problems:  Patient Active Problem List   Diagnosis Code    Essential hypertension I10    Smoking trying to quit Z72.0    Benign non-nodular prostatic hyperplasia with lower urinary tract symptoms N40.1    Diverticulosis of large intestine without hemorrhage K57.30    Colon polyp K63.5    Wound infection T14.8XXA, L08.9    Stroke-like episode R29.90    Coffee ground emesis K92.0    GIB (gastrointestinal bleeding) K92.2    Lactic acidosis E87.20    Constipation K59.00       Isolation/Infection:   Isolation            No Isolation          Patient Infection Status       Infection Onset Added Last Indicated Last Indicated By Review Planned Expiration Resolved Resolved By    None active    Resolved    MRSA 22 Culture, Wound Aerobic Only   23 Stephanie Reyna RN    Abscess 11/3/22                  with patient):  None    RN SIGNATURE:  Electronically signed by Marisel Miner, YVETTE on 10/31/24 at 10:31 AM EDT    CASE MANAGEMENT/SOCIAL WORK SECTION    Inpatient Status Date: ***    Readmission Risk Assessment Score:  Readmission Risk              Risk of Unplanned Readmission:  0           Discharging to Facility/ Agency   Name: [facility] SANTOS  Address:  Phone:  Fax:    Dialysis Facility (if applicable)   Name:  Address:  Dialysis Schedule:  Phone:  Fax:    / signature: Electronically signed by NITZA Martinez on 10/29/24 at 2:00 PM EDT    PHYSICIAN SECTION    Prognosis: Fair    Condition at Discharge: Stable    Rehab Potential (if transferring to Rehab): Fair    Recommended Labs or Other Treatments After Discharge: cbc and bmp in 1 week     Physician Certification: I certify the above information and transfer of Kirt May  is necessary for the continuing treatment of the diagnosis listed and that he requires Skilled Nursing Facility for greater 30 days.     Update Admission H&P: No change in H&P    PHYSICIAN SIGNATURE:  Electronically signed by Kobe Wu MD on 10/31/24 at 9:43 AM EDT

## 2024-10-30 LAB
ANION GAP SERPL CALCULATED.3IONS-SCNC: 9 MMOL/L (ref 7–16)
BUN SERPL-MCNC: 21 MG/DL (ref 6–23)
CALCIUM SERPL-MCNC: 8.8 MG/DL (ref 8.6–10.2)
CHLORIDE SERPL-SCNC: 106 MMOL/L (ref 98–107)
CO2 SERPL-SCNC: 26 MMOL/L (ref 22–29)
CREAT SERPL-MCNC: 0.6 MG/DL (ref 0.7–1.2)
ERYTHROCYTE [DISTWIDTH] IN BLOOD BY AUTOMATED COUNT: 14.4 % (ref 11.5–15)
GFR, ESTIMATED: >90 ML/MIN/1.73M2
GLUCOSE BLD-MCNC: 103 MG/DL (ref 74–99)
GLUCOSE BLD-MCNC: 109 MG/DL (ref 74–99)
GLUCOSE BLD-MCNC: 66 MG/DL (ref 74–99)
GLUCOSE BLD-MCNC: 73 MG/DL (ref 74–99)
GLUCOSE BLD-MCNC: 76 MG/DL (ref 74–99)
GLUCOSE BLD-MCNC: 84 MG/DL (ref 74–99)
GLUCOSE BLD-MCNC: 87 MG/DL (ref 74–99)
GLUCOSE SERPL-MCNC: 71 MG/DL (ref 74–99)
HCT VFR BLD AUTO: 33.6 % (ref 37–54)
HCT VFR BLD AUTO: 34.2 % (ref 37–54)
HCT VFR BLD AUTO: 35.7 % (ref 37–54)
HCT VFR BLD AUTO: 37.6 % (ref 37–54)
HGB BLD-MCNC: 10.5 G/DL (ref 12.5–16.5)
HGB BLD-MCNC: 10.5 G/DL (ref 12.5–16.5)
HGB BLD-MCNC: 11.1 G/DL (ref 12.5–16.5)
HGB BLD-MCNC: 11.8 G/DL (ref 12.5–16.5)
MCH RBC QN AUTO: 30.2 PG (ref 26–35)
MCHC RBC AUTO-ENTMCNC: 30.7 G/DL (ref 32–34.5)
MCV RBC AUTO: 98.3 FL (ref 80–99.9)
PLATELET # BLD AUTO: 189 K/UL (ref 130–450)
PMV BLD AUTO: 10 FL (ref 7–12)
POTASSIUM SERPL-SCNC: 3.5 MMOL/L (ref 3.5–5)
RBC # BLD AUTO: 3.48 M/UL (ref 3.8–5.8)
SODIUM SERPL-SCNC: 141 MMOL/L (ref 132–146)
WBC OTHER # BLD: 6.2 K/UL (ref 4.5–11.5)

## 2024-10-30 PROCEDURE — 2700000000 HC OXYGEN THERAPY PER DAY

## 2024-10-30 PROCEDURE — 85014 HEMATOCRIT: CPT

## 2024-10-30 PROCEDURE — 2580000003 HC RX 258: Performed by: NURSE PRACTITIONER

## 2024-10-30 PROCEDURE — 99232 SBSQ HOSP IP/OBS MODERATE 35: CPT

## 2024-10-30 PROCEDURE — 85018 HEMOGLOBIN: CPT

## 2024-10-30 PROCEDURE — 99232 SBSQ HOSP IP/OBS MODERATE 35: CPT | Performed by: NURSE PRACTITIONER

## 2024-10-30 PROCEDURE — 96361 HYDRATE IV INFUSION ADD-ON: CPT

## 2024-10-30 PROCEDURE — 36415 COLL VENOUS BLD VENIPUNCTURE: CPT

## 2024-10-30 PROCEDURE — G0378 HOSPITAL OBSERVATION PER HR: HCPCS

## 2024-10-30 PROCEDURE — 80048 BASIC METABOLIC PNL TOTAL CA: CPT

## 2024-10-30 PROCEDURE — 6360000002 HC RX W HCPCS: Performed by: NURSE PRACTITIONER

## 2024-10-30 PROCEDURE — 6370000000 HC RX 637 (ALT 250 FOR IP): Performed by: NURSE PRACTITIONER

## 2024-10-30 PROCEDURE — 85027 COMPLETE CBC AUTOMATED: CPT

## 2024-10-30 PROCEDURE — 82962 GLUCOSE BLOOD TEST: CPT

## 2024-10-30 PROCEDURE — 94640 AIRWAY INHALATION TREATMENT: CPT

## 2024-10-30 PROCEDURE — 96376 TX/PRO/DX INJ SAME DRUG ADON: CPT

## 2024-10-30 RX ADMIN — SENNOSIDES 8.6 MG: 8.6 TABLET, FILM COATED ORAL at 20:30

## 2024-10-30 RX ADMIN — Medication 5 MG: at 20:36

## 2024-10-30 RX ADMIN — LOSARTAN POTASSIUM 50 MG: 50 TABLET, FILM COATED ORAL at 08:04

## 2024-10-30 RX ADMIN — BACLOFEN 20 MG: 10 TABLET ORAL at 13:44

## 2024-10-30 RX ADMIN — BACLOFEN 20 MG: 10 TABLET ORAL at 08:04

## 2024-10-30 RX ADMIN — TRAMADOL HYDROCHLORIDE 50 MG: 50 TABLET, COATED ORAL at 20:31

## 2024-10-30 RX ADMIN — SODIUM CHLORIDE, PRESERVATIVE FREE 40 MG: 5 INJECTION INTRAVENOUS at 20:29

## 2024-10-30 RX ADMIN — SODIUM CHLORIDE, PRESERVATIVE FREE 40 MG: 5 INJECTION INTRAVENOUS at 08:05

## 2024-10-30 RX ADMIN — ATORVASTATIN CALCIUM 20 MG: 20 TABLET, FILM COATED ORAL at 08:05

## 2024-10-30 RX ADMIN — DEXTROSE MONOHYDRATE 125 ML: 100 INJECTION, SOLUTION INTRAVENOUS at 08:43

## 2024-10-30 RX ADMIN — SODIUM CHLORIDE, PRESERVATIVE FREE 10 ML: 5 INJECTION INTRAVENOUS at 20:33

## 2024-10-30 RX ADMIN — ARFORMOTEROL TARTRATE 15 MCG: 15 SOLUTION RESPIRATORY (INHALATION) at 20:03

## 2024-10-30 RX ADMIN — BACLOFEN 20 MG: 10 TABLET ORAL at 20:32

## 2024-10-30 RX ADMIN — ARFORMOTEROL TARTRATE 15 MCG: 15 SOLUTION RESPIRATORY (INHALATION) at 08:22

## 2024-10-30 RX ADMIN — SODIUM CHLORIDE, PRESERVATIVE FREE 10 ML: 5 INJECTION INTRAVENOUS at 08:05

## 2024-10-30 RX ADMIN — AMLODIPINE BESYLATE 5 MG: 5 TABLET ORAL at 08:04

## 2024-10-30 ASSESSMENT — PAIN - FUNCTIONAL ASSESSMENT
PAIN_FUNCTIONAL_ASSESSMENT: ACTIVITIES ARE NOT PREVENTED
PAIN_FUNCTIONAL_ASSESSMENT: PREVENTS OR INTERFERES SOME ACTIVE ACTIVITIES AND ADLS

## 2024-10-30 ASSESSMENT — PAIN DESCRIPTION - ORIENTATION
ORIENTATION: LOWER;MID
ORIENTATION: MID;LOWER

## 2024-10-30 ASSESSMENT — PAIN DESCRIPTION - FREQUENCY: FREQUENCY: CONTINUOUS

## 2024-10-30 ASSESSMENT — PAIN DESCRIPTION - PAIN TYPE
TYPE: ACUTE PAIN
TYPE: ACUTE PAIN

## 2024-10-30 ASSESSMENT — PAIN DESCRIPTION - DESCRIPTORS
DESCRIPTORS: ACHING;DISCOMFORT
DESCRIPTORS: ACHING;DISCOMFORT;SORE

## 2024-10-30 ASSESSMENT — PAIN SCALES - GENERAL
PAINLEVEL_OUTOF10: 5
PAINLEVEL_OUTOF10: 2
PAINLEVEL_OUTOF10: 3

## 2024-10-30 ASSESSMENT — PAIN DESCRIPTION - LOCATION
LOCATION: ABDOMEN;GENERALIZED
LOCATION: ABDOMEN

## 2024-10-30 ASSESSMENT — PAIN DESCRIPTION - ONSET: ONSET: ON-GOING

## 2024-10-30 NOTE — PROGRESS NOTES
Gastroenterology, Hepatology, &  Advanced Endoscopy    Progress Note      Reason for Consult: Drop in Hgb, coffee ground emesis, pt had been on plavix, ASA.    HPI:   Kirt May is a 74 y.o. male w/ PMH of  has a past medical history of Benign prostate hyperplasia, Cerebral artery occlusion with cerebral infarction (HCC), Diabetes mellitus (HCC), Dysphagia, Hemiplegia (HCC), Hyperlipidemia, Hypertension, and Urinary incontinence. who presents to the ED for emesis. Patient reportedly had 1 episode of coffee-ground emesis. Patient is alert and oriented x 2 with no complaints at this time. Patient denies any chest pain or shortness of breath. Does admit to 1 episode of coffee-ground emesis. Patient is on Plavix. Pt had Tachycardia in the ED pulse ranging from 101 to 121. CT A/P Thickening of the distal esophageal wall may reflect esophagitis.  Percutaneous gastrostomy tube. The stomach is distended.       PMH:       Diagnosis Date    Benign prostate hyperplasia     Cerebral artery occlusion with cerebral infarction (HCC)     Diabetes mellitus (HCC)     Dysphagia     Hemiplegia (HCC)     Hyperlipidemia     Hypertension     Urinary incontinence         PSH:       Procedure Laterality Date    UPPER GASTROINTESTINAL ENDOSCOPY N/A 2024    ESOPHAGOGASTRODUODENOSCOPY PERCUTANEOUS ENDOSCOPIC GASTROSTOMY TUBE PLACEMENT             +++FACILITY+++ performed by Mohsen Mejia MD at [facility] ENDOSCOPY    UPPER GASTROINTESTINAL ENDOSCOPY N/A 10/29/2024    ESOPHAGOGASTRODUODENOSCOPY performed by Augusto Myrick MD at [facility]OSCOPY        Family History:       Problem Relation Age of Onset    Cancer Father         Social History:   Social History     Tobacco Use    Smoking status: Some Days     Current packs/day: 0.00     Average packs/day: 1 pack/day for 40.0 years (40.0 ttl pk-yrs)     Types: Cigarettes     Start date: 10/11/1975     Last attempt to quit: 10/11/2015     Years since quittin.0    Smokeless tobacco: Never  acetaminophen (TYLENOL) tablet 650 mg  650 mg Oral Q6H PRN Hunkus, Daysi Courtney, APRN - NP        Or    acetaminophen (TYLENOL) suppository 650 mg  650 mg Rectal Q6H PRN Hunkus, Daysi Courtney, APRN - NP        pantoprazole (PROTONIX) 40 mg in sodium chloride (PF) 0.9 % 10 mL injection  40 mg IntraVENous Q12H Hunkus, Daysi Courtney, APRN - NP   40 mg at 10/30/24 0805    melatonin disintegrating tablet 5 mg  5 mg Oral QPM Hunkus, Daysi Courtney, APRN - NP   5 mg at 10/28/24 1805    melatonin disintegrating tablet 5 mg  5 mg Oral Nightly PRN Ronikus, Daysi Courtney, APRN - NP        hydrALAZINE (APRESOLINE) injection 10 mg  10 mg IntraVENous Q6H PRN Hunkus, Daysi Courtney, APRN - NP        calcium carbonate (TUMS) chewable tablet 500 mg  500 mg Oral TID PRN Ronikus, Daysi Courtney, APRN - NP        Polyvinyl Alcohol-Povidone PF (REFRESH) 1.4-0.6 % ophthalmic solution 1 drop  1 drop Both Eyes PRN Ronikus, Daysi Courtney, APRN - NP        sodium chloride (OCEAN, BABY AYR) 0.65 % nasal spray 1 spray  1 spray Each Nostril PRN Ronikus, Daysi Courtney, APRN - NP        magnesium hydroxide (MILK OF MAGNESIA) 400 MG/5ML suspension 30 mL  30 mL Oral Daily PRN Ronikus, Daysi Courtney, APRN - NP        benzocaine-menthol (CEPACOL SORE THROAT) lozenge 1 lozenge  1 lozenge Oral Q2H PRN Ronikus, Daysi Courtney, APRN - NP        benzonatate (TESSALON) capsule 100 mg  100 mg Oral TID PRN Ronikus, Daysi Courtney, APRN - NP        glucose chewable tablet 16 g  4 tablet Oral PRN Ronikus, Daysi Courtney, APRN - NP        dextrose bolus 10% 125 mL  125 mL IntraVENous PRN Ronikus, Daysi Courtney, APRN - .5 mL/hr at 10/30/24 0843 125 mL at 10/30/24 0843    Or    dextrose bolus 10% 250 mL  250 mL IntraVENous PRN Ronikus, Daysi Courtney, APRN - NP        glucagon injection 1 mg  1 mg SubCUTAneous PRN Monica, SIM Santos NP        dextrose 10 % infusion   IntraVENous Continuous PRN Monica, SIM Santos NP        senna (SENOKOT) tablet 8.6 mg  1 tablet Oral Nightly Monica, SIM Santos -

## 2024-10-30 NOTE — PROGRESS NOTES
PCP: Abhijit Condon MD    Date of Service: Pt seen/examined on 10/30/2024     Chief Complaint:  had concerns including Vomiting (Large amount coffee ground emesis. No abd pain. Pt is on plavix. ).    History of Present Illness:    Mr. Kirt May, a 74 y.o. year old male  who  has a past medical history of Benign prostate hyperplasia, Cerebral artery occlusion with cerebral infarction (HCC), Diabetes mellitus (HCC), Dysphagia, Hemiplegia (HCC), Hyperlipidemia, Hypertension, and Urinary incontinence.     Patient presented to the ED from nursing home for evaluation after 1 occurrence of coffee-ground emesis. He is a questionable historian and HPI is supplemented by review of EMR and paperwork provided by [facility]. He reports not feeling well x 1 day with a cough, nausea, and diarrhea.  Of note he does have a PEG tube which was placed in 6/24 for malnutrition. He denies any abdominal pain, chest pain, shortness of breath, fevers, chills.  He is not on blood thinners but is on Plavix.    ER COURSE:  In ED he was tachycardic which improved with IV fluids.  Vitals otherwise stable.  Laboratory workup significant for lactic acidosis.  H&H stable.  CTAP with evidence of esophagitis, stomach distention, and moderate stool burden.  He was given Protonix and Zofran in ED.    PREVIOUS HOSPITALIZATION:  Admission from 6/23 - 6/28 reviewed.  Admitted after being found down at home during a welfare check.  Left-sided flaccidity on admission.  Found to have a large right MCA CVA.      Past Medical History:        Diagnosis Date    Benign prostate hyperplasia     Cerebral artery occlusion with cerebral infarction (HCC)     Diabetes mellitus (HCC)     Dysphagia     Hemiplegia (HCC)     Hyperlipidemia     Hypertension     Urinary incontinence        Past Surgical History:        Procedure Laterality Date    UPPER GASTROINTESTINAL ENDOSCOPY N/A 6/4/2024    ESOPHAGOGASTRODUODENOSCOPY PERCUTANEOUS ENDOSCOPIC GASTROSTOMY TUBE

## 2024-10-30 NOTE — CARE COORDINATION
Plan is to return to [facility] where patient is there long term care when medically ready, no precert required. Per GI note today, EGD 10/29/24 performed. Ok to resume Plavix/ASA. Hgb 10.5 today. -Resumed tube feeds and soft pureed diet. Await tolerance. Ambulance form (will need completed on day of discharge), face sheet and envelope is on soft chart. No Hens needed since patient is from this facility.  Rosina Francisco RN CM  [phone]

## 2024-10-31 VITALS
HEART RATE: 56 BPM | DIASTOLIC BLOOD PRESSURE: 56 MMHG | TEMPERATURE: 97 F | SYSTOLIC BLOOD PRESSURE: 102 MMHG | RESPIRATION RATE: 17 BRPM | OXYGEN SATURATION: 98 %

## 2024-10-31 PROBLEM — K92.2 GI BLEED: Status: ACTIVE | Noted: 2024-10-31

## 2024-10-31 LAB
ANION GAP SERPL CALCULATED.3IONS-SCNC: 9 MMOL/L (ref 7–16)
BUN SERPL-MCNC: 17 MG/DL (ref 6–23)
CALCIUM SERPL-MCNC: 9.1 MG/DL (ref 8.6–10.2)
CHLORIDE SERPL-SCNC: 109 MMOL/L (ref 98–107)
CO2 SERPL-SCNC: 30 MMOL/L (ref 22–29)
CREAT SERPL-MCNC: 0.6 MG/DL (ref 0.7–1.2)
ERYTHROCYTE [DISTWIDTH] IN BLOOD BY AUTOMATED COUNT: 14 % (ref 11.5–15)
GFR, ESTIMATED: >90 ML/MIN/1.73M2
GLUCOSE BLD-MCNC: 102 MG/DL (ref 74–99)
GLUCOSE BLD-MCNC: 99 MG/DL (ref 74–99)
GLUCOSE SERPL-MCNC: 87 MG/DL (ref 74–99)
HCT VFR BLD AUTO: 32.4 % (ref 37–54)
HCT VFR BLD AUTO: 34.7 % (ref 37–54)
HCT VFR BLD AUTO: 35.5 % (ref 37–54)
HGB BLD-MCNC: 10.3 G/DL (ref 12.5–16.5)
HGB BLD-MCNC: 11.2 G/DL (ref 12.5–16.5)
HGB BLD-MCNC: 11.3 G/DL (ref 12.5–16.5)
MCH RBC QN AUTO: 30.8 PG (ref 26–35)
MCHC RBC AUTO-ENTMCNC: 32.3 G/DL (ref 32–34.5)
MCV RBC AUTO: 95.3 FL (ref 80–99.9)
PLATELET # BLD AUTO: 205 K/UL (ref 130–450)
PMV BLD AUTO: 10.3 FL (ref 7–12)
POTASSIUM SERPL-SCNC: 3.6 MMOL/L (ref 3.5–5)
RBC # BLD AUTO: 3.64 M/UL (ref 3.8–5.8)
SODIUM SERPL-SCNC: 148 MMOL/L (ref 132–146)
WBC OTHER # BLD: 6.2 K/UL (ref 4.5–11.5)

## 2024-10-31 PROCEDURE — G0378 HOSPITAL OBSERVATION PER HR: HCPCS

## 2024-10-31 PROCEDURE — 80048 BASIC METABOLIC PNL TOTAL CA: CPT

## 2024-10-31 PROCEDURE — 82962 GLUCOSE BLOOD TEST: CPT

## 2024-10-31 PROCEDURE — 85018 HEMOGLOBIN: CPT

## 2024-10-31 PROCEDURE — 96376 TX/PRO/DX INJ SAME DRUG ADON: CPT

## 2024-10-31 PROCEDURE — 2580000003 HC RX 258: Performed by: NURSE PRACTITIONER

## 2024-10-31 PROCEDURE — 2700000000 HC OXYGEN THERAPY PER DAY

## 2024-10-31 PROCEDURE — 1200000000 HC SEMI PRIVATE

## 2024-10-31 PROCEDURE — 94640 AIRWAY INHALATION TREATMENT: CPT

## 2024-10-31 PROCEDURE — 99239 HOSP IP/OBS DSCHRG MGMT >30: CPT | Performed by: STUDENT IN AN ORGANIZED HEALTH CARE EDUCATION/TRAINING PROGRAM

## 2024-10-31 PROCEDURE — 36415 COLL VENOUS BLD VENIPUNCTURE: CPT

## 2024-10-31 PROCEDURE — 85014 HEMATOCRIT: CPT

## 2024-10-31 PROCEDURE — 85027 COMPLETE CBC AUTOMATED: CPT

## 2024-10-31 PROCEDURE — 6370000000 HC RX 637 (ALT 250 FOR IP): Performed by: NURSE PRACTITIONER

## 2024-10-31 PROCEDURE — 6360000002 HC RX W HCPCS: Performed by: NURSE PRACTITIONER

## 2024-10-31 RX ADMIN — ATORVASTATIN CALCIUM 20 MG: 20 TABLET, FILM COATED ORAL at 08:31

## 2024-10-31 RX ADMIN — ARFORMOTEROL TARTRATE 15 MCG: 15 SOLUTION RESPIRATORY (INHALATION) at 08:44

## 2024-10-31 RX ADMIN — BACLOFEN 20 MG: 10 TABLET ORAL at 08:32

## 2024-10-31 RX ADMIN — SODIUM CHLORIDE, PRESERVATIVE FREE 10 ML: 5 INJECTION INTRAVENOUS at 08:32

## 2024-10-31 RX ADMIN — SODIUM CHLORIDE, PRESERVATIVE FREE 40 MG: 5 INJECTION INTRAVENOUS at 08:32

## 2024-10-31 ASSESSMENT — PAIN SCALES - GENERAL
PAINLEVEL_OUTOF10: 0
PAINLEVEL_OUTOF10: 0

## 2024-10-31 NOTE — PLAN OF CARE
Problem: Chronic Conditions and Co-morbidities  Goal: Patient's chronic conditions and co-morbidity symptoms are monitored and maintained or improved  Outcome: Completed     Problem: Safety - Adult  Goal: Free from fall injury  Outcome: Completed     Problem: Pain  Goal: Verbalizes/displays adequate comfort level or baseline comfort level  Outcome: Completed     Problem: Skin/Tissue Integrity  Goal: Absence of new skin breakdown  Description: 1.  Monitor for areas of redness and/or skin breakdown  2.  Assess vascular access sites hourly  3.  Every 4-6 hours minimum:  Change oxygen saturation probe site  4.  Every 4-6 hours:  If on nasal continuous positive airway pressure, respiratory therapy assess nares and determine need for appliance change or resting period.  Outcome: Completed

## 2024-10-31 NOTE — DISCHARGE SUMMARY
[facility]   IN-PATIENT SERVICE      Discharge Summary     Patient ID: Kirt May  :  1950   MRN: [id number]     ACCOUNT:  [id number]   Patient's PCP: Abhijit Condon MD  Admit Date: 10/28/2024   Discharge Date: 10/31/2024     Length of Stay: 0  Code Status:  Full Code  Admitting Physician: Kobe Wu MD  Discharge Physician: Kobe uW MD     Active Discharge Diagnoses:     Hospital Problem Lists:  Principal Problem:    Coffee ground emesis  Active Problems:    GIB (gastrointestinal bleeding)    Lactic acidosis    Constipation    GI bleed  Resolved Problems:    * No resolved hospital problems. *      Admission Condition:  fair     Discharged Condition: fair    Hospital Stay:     Hospital Course:  Kirt May is a 74 y.o. male who was admitted for the management of   Coffee ground emesis , presented to ER with Vomiting (Large amount coffee ground emesis. No abd pain. Pt is on plavix. )    Mr. Kirt May, a 74 y.o. year old male  who  has a past medical history of Benign prostate hyperplasia, Cerebral artery occlusion with cerebral infarction (HCC), Diabetes mellitus (HCC), Dysphagia, Hemiplegia (HCC), Hyperlipidemia, Hypertension, and Urinary incontinence.      Patient presented to the ED from nursing home for evaluation after 1 occurrence of coffee-ground emesis. He is a questionable historian and HPI is supplemented by review of EMR and paperwork provided by [facility]. He reports not feeling well x 1 day with a cough, nausea, and diarrhea.  Of note he does have a PEG tube which was placed in  for malnutrition. He denies any abdominal pain, chest pain, shortness of breath, fevers, chills.  He is not on blood thinners but is on Plavix.    Per GI:  EGD 10/29/24 performed  -Ok to resume Plavix/ASA  Significant therapeutic interventions: EGD 10/29    Significant Diagnostic Studies:   Labs / Micro:  CBC:   Lab Results   Component Value Date/Time    WBC  6.2 10/31/2024 04:12 AM    RBC 3.64 10/31/2024 04:12 AM    HGB 11.3 10/31/2024 07:50 AM    HCT 35.5 10/31/2024 07:50 AM    MCV 95.3 10/31/2024 04:12 AM    MCH 30.8 10/31/2024 04:12 AM    MCHC 32.3 10/31/2024 04:12 AM    RDW 14.0 10/31/2024 04:12 AM     10/31/2024 04:12 AM     BMP:    Lab Results   Component Value Date/Time    GLUCOSE 87 10/31/2024 04:12 AM     10/31/2024 04:12 AM    K 3.6 10/31/2024 04:12 AM    K 4.4 11/03/2022 04:45 AM     10/31/2024 04:12 AM    CO2 30 10/31/2024 04:12 AM    ANIONGAP 9 10/31/2024 04:12 AM    BUN 17 10/31/2024 04:12 AM    CREATININE 0.6 10/31/2024 04:12 AM    CALCIUM 9.1 10/31/2024 04:12 AM    LABGLOM >90 10/31/2024 04:12 AM    LABGLOM >60 12/14/2023 06:53 AM    GFRAA >60 03/21/2021 07:26 PM        Radiology:  XR CHEST (2 VW)    Result Date: 10/28/2024  1. Increased markings and peribronchial cuffing which may be related to bronchitis. 2. Atherosclerotic changes of the aorta without cardiomegaly.     CT ABDOMEN PELVIS W IV CONTRAST Additional Contrast? None    Result Date: 10/28/2024  1. No acute intra-abdominal or pelvic process. 2. Fluid/secretions noted within left lower lobe bronchioles. 3. Thickening of the distal esophageal wall may reflect esophagitis. 4. Percutaneous gastrostomy tube. The stomach is distended. 5. Moderate stool within the rectal sigmoid colon.       Consultations:    Consults:     Final Specialist Recommendations/Findings:   IP CONSULT TO INTERNAL MEDICINE  IP CONSULT TO GI  IP CONSULT TO DIETITIAN      The patient was seen and examined on day of discharge and this discharge summary is in conjunction with any daily progress note from day of discharge.    Discharge plan:     Disposition: To a non-[facility] facility    Physician Follow Up:     [facility]  [address]  [facility] [address]  [phone]        Augusto Myrick MD  [address]  [place] [id number]  [phone]    Follow up in 1 week(s)         Requiring Further

## 2024-10-31 NOTE — CARE COORDINATION
Discharge order noted. Patient was here under observation for Coffee Grounds Emesis. Had EGD with normal exam. Transportation is set up via [facility] for 12 pm today to [facility]. Charge nurse, RN, liaison, patient and ASHVIN Beebe notified. Completed and signed Ambulance form, face sheet and envelope is on soft chart. No Hens needed since patient is from this facility.   Rosina Francisco RN CM  [phone]

## 2024-11-01 NOTE — PROGRESS NOTES
Physician Progress Note      PATIENT:               MELINDA BOX  CSN #:                  [id number]  :                       1950  ADMIT DATE:       10/28/2024 1:17 AM  DISCH DATE:        10/31/2024 3:29 PM  RESPONDING  PROVIDER #:        Kobe Wu MD          QUERY TEXT:    Patient presented with coffee ground emesis and concerns for GI bleeding. Pt   noted to be on Plavix.  If possible, please document in progress notes and   discharge summary the cause of the GI bleeding:    The medical record reflects the following:  Risk Factors: Hx of stoke on Plavix and aspirin, Advanced Age >70  Clinical Indicators: H&P \"...Patient presented to the ED from nursing home for   evaluation after 1 occurrence of coffee-ground emesis...is on Plavix... CTAP   with evidence of esophagitis, stomach distention, and moderate stool   burden...GIB...Coffee ground emesis x 1, Occult stool +...\", GI Progress Note   10/30 \"...EGD Findings: 10/29/24 Normal exam. No high risk lesions...\",   Progress Note  Hgb 14.3-10.3, Hct 44.3-32.4  Treatment: Labs, IV Protonix, GI Consult, EGD, Plavix and Aspirin held    Thank you,  Anselmo Locke BSN, RN, CRCR  Clinical Documentation Integrity  [phone]  Options provided:  -- GI bleeding due to use of Plavix.  -- GI bleeding due to other, please specify etiology.  -- Other - I will add my own diagnosis  -- Disagree - Not applicable / Not valid  -- Disagree - Clinically unable to determine / Unknown  -- Refer to Clinical Documentation Reviewer    PROVIDER RESPONSE TEXT:    This patient has GI bleeding due to use of Plavix.    Query created by: Anselmo Locke on 10/31/2024 3:04 PM      Electronically signed by:  Kobe Wu MD 2024 11:33 AM

## 2025-01-08 ENCOUNTER — OFFICE VISIT (OUTPATIENT)
Age: 75
End: 2025-01-08
Payer: MEDICARE

## 2025-01-08 VITALS — TEMPERATURE: 98.9 F | OXYGEN SATURATION: 92 % | HEART RATE: 65 BPM | RESPIRATION RATE: 18 BRPM

## 2025-01-08 DIAGNOSIS — K92.0 COFFEE GROUND EMESIS: Primary | ICD-10-CM

## 2025-01-08 PROCEDURE — 1159F MED LIST DOCD IN RCRD: CPT | Performed by: NURSE PRACTITIONER

## 2025-01-08 PROCEDURE — M1308 PR FLU IMMUNIZE NO ADMIN: HCPCS | Performed by: NURSE PRACTITIONER

## 2025-01-08 PROCEDURE — G8427 DOCREV CUR MEDS BY ELIG CLIN: HCPCS | Performed by: NURSE PRACTITIONER

## 2025-01-08 PROCEDURE — 4004F PT TOBACCO SCREEN RCVD TLK: CPT | Performed by: NURSE PRACTITIONER

## 2025-01-08 PROCEDURE — 3017F COLORECTAL CA SCREEN DOC REV: CPT | Performed by: NURSE PRACTITIONER

## 2025-01-08 PROCEDURE — 1123F ACP DISCUSS/DSCN MKR DOCD: CPT | Performed by: NURSE PRACTITIONER

## 2025-01-08 PROCEDURE — 99212 OFFICE O/P EST SF 10 MIN: CPT | Performed by: NURSE PRACTITIONER

## 2025-01-08 PROCEDURE — G8420 CALC BMI NORM PARAMETERS: HCPCS | Performed by: NURSE PRACTITIONER

## 2025-01-08 NOTE — PROGRESS NOTES
Kirt May (:  1950) is a 74 y.o. male, here for evaluation of the following chief complaint(s):  Follow-up (Pt here for a follow up. )      SUBJECTIVE/OBJECTIVE:  HPI:    Kirt is a very pleasant 74 year old gentleman that presents today for hospital follow up on  coffee ground emesis.  Patient resides in a nursing home     Past medical history of Benign prostate hyperplasia, Cerebral artery occlusion with cerebral infarction (HCC), Diabetes mellitus (HCC), Dysphagia, Hemiplegia (HCC), Hyperlipidemia, Hypertension, and Urinary incontinence. who presents to the ED for emesis. The patient has a history of a CVA and is s/p PEG and on anti-platelet medication. Patient reportedly had one episode of coffee-ground emesis.     EGD with Dr. Myrick while inpatient-   Normal esophagus. Z-line regular. Normal stomach. A PEG was found in the stomach. Normal examined duodenum. No specimens collected.     Patient is in a reclined chad chair.  Patient is a poor historian.  Unaware of tube feeding schedule or bowel regimen.  Denies abdominal pain.  Unsure if he has had vomiting as of recent.  Patient does not have anyone accompanying him today    Lab work 10/31/24- HGB 11.3, HCT 35.5         ROS:  General: Patient denies n/v/f/c or weight loss.  HEENT: Patient denies persistent postnasal drip, scleral icterus, drooling, persistent bleeding from nose/mouth.  Resp: Patient denies SOB, wheezing, productive cough.  Cards: Patient denies CP, palpitations, significant edema  GI: As above.  Derm: Patient denies jaundice/rashes.   Musc: Patient denies diffuse/irregular joint swelling or myalgias.      Objective   Wt Readings from Last 3 Encounters:   24 57.2 kg (126 lb)   24 57.2 kg (126 lb)   23 84.8 kg (187 lb)     Temp Readings from Last 3 Encounters:   25 98.9 °F (37.2 °C) (Infrared)   10/31/24 97 °F (36.1 °C) (Temporal)   10/13/24 97.6 °F (36.4 °C) (Oral)     BP Readings from Last 3 Encounters: 
Normal

## 2025-01-14 DIAGNOSIS — K92.0 COFFEE GROUND EMESIS: ICD-10-CM

## 2025-07-13 ENCOUNTER — HOSPITAL ENCOUNTER (EMERGENCY)
Age: 75
Discharge: HOME | End: 2025-07-13
Attending: EMERGENCY MEDICINE
Payer: MEDICARE

## 2025-07-13 ENCOUNTER — APPOINTMENT (OUTPATIENT)
Dept: GENERAL RADIOLOGY | Age: 75
End: 2025-07-13
Payer: MEDICARE

## 2025-07-13 VITALS
HEART RATE: 80 BPM | TEMPERATURE: 97.7 F | BODY MASS INDEX: 19.26 KG/M2 | RESPIRATION RATE: 16 BRPM | WEIGHT: 130 LBS | SYSTOLIC BLOOD PRESSURE: 110 MMHG | OXYGEN SATURATION: 98 % | DIASTOLIC BLOOD PRESSURE: 78 MMHG | HEIGHT: 69 IN

## 2025-07-13 DIAGNOSIS — Z43.1 ATTENTION TO G-TUBE (HCC): Primary | ICD-10-CM

## 2025-07-13 PROCEDURE — 43762 RPLC GTUBE NO REVJ TRC: CPT

## 2025-07-13 PROCEDURE — 74018 RADEX ABDOMEN 1 VIEW: CPT

## 2025-07-13 PROCEDURE — 99284 EMERGENCY DEPT VISIT MOD MDM: CPT

## 2025-07-13 PROCEDURE — 6360000004 HC RX CONTRAST MEDICATION

## 2025-07-13 RX ORDER — DIATRIZOATE MEGLUMINE AND DIATRIZOATE SODIUM 660; 100 MG/ML; MG/ML
30 SOLUTION ORAL; RECTAL
Status: DISCONTINUED | OUTPATIENT
Start: 2025-07-13 | End: 2025-07-14 | Stop reason: HOSPADM

## 2025-07-13 RX ADMIN — DIATRIZOATE MEGLUMINE AND DIATRIZOATE SODIUM 30 ML: 600; 100 SOLUTION ORAL; RECTAL at 21:32

## 2025-07-13 ASSESSMENT — PAIN - FUNCTIONAL ASSESSMENT
PAIN_FUNCTIONAL_ASSESSMENT: NONE - DENIES PAIN
PAIN_FUNCTIONAL_ASSESSMENT: NONE - DENIES PAIN

## 2025-07-14 NOTE — ED PROVIDER NOTES
(with meals)    Uyen Escalante MD   Multiple Vitamin (MULTI-DAY VITAMINS) TABS Take 1 tablet by mouth daily    Uyen Escalante MD   salmeterol (SEREVENT DISKUS) 50 MCG/ACT AEPB diskus inhaler Inhale 1 puff into the lungs in the morning and at bedtime    Uyen Escalante MD   simvastatin (ZOCOR) 40 MG tablet Take 1 tablet by mouth nightly    Uyen Escalante MD   albuterol sulfate HFA (VENTOLIN HFA) 108 (90 Base) MCG/ACT inhaler Inhale 2 puffs into the lungs every 6 hours as needed for Wheezing    Uyen Escalante MD       TOBACCO:   reports that he has been smoking cigarettes. He started smoking about 49 years ago. He has a 40 pack-year smoking history. He has never used smokeless tobacco.  ETOH:   reports that he does not currently use alcohol.    Allergies: Lisinopril    REVIEW OF EXTERNAL NOTE :      Chart reviewed:     No current facility-administered medications on file prior to encounter.     Current Outpatient Medications on File Prior to Encounter   Medication Sig Dispense Refill    baclofen (LIORESAL) 20 MG tablet Take 1 tablet by mouth 3 times daily 90 tablet 2    acetaminophen (TYLENOL) 325 MG tablet Take 2 tablets by mouth every 6 hours as needed for Pain      bisacodyl (DULCOLAX) 10 MG suppository Place 1 suppository rectally daily as needed for Constipation      polyethylene glycol (GLYCOLAX) 17 g packet Take 1 packet by mouth daily as needed for Constipation      magnesium hydroxide (MILK OF MAGNESIA) 400 MG/5ML suspension Take 30 mLs by mouth daily as needed for Constipation      traMADol (ULTRAM) 50 MG tablet Take 1 tablet by mouth every 8 hours as needed for Pain.      losartan (COZAAR) 50 MG tablet Take 1 tablet by mouth in the morning and at bedtime 30 tablet 3    amLODIPine (NORVASC) 5 MG tablet Take 1 tablet by mouth daily 30 tablet 0    clopidogrel (PLAVIX) 75 MG tablet Take 1 tablet by mouth daily 30 tablet 0    aspirin 81 MG EC tablet Take 1 tablet by mouth daily

## (undated) DEVICE — SPONGE,DRAIN,NONWVN,4"X4",6PLY,STRL,LF: Brand: MEDLINE

## (undated) DEVICE — 3M™ MICROPORE™ TAPE, 1530-2: Brand: 3M™ MICROPORE™

## (undated) DEVICE — SPONGE GZ W4XL4IN RAYON POLY CVR W/NONWOVEN FAB STRL 2/PK

## (undated) DEVICE — TOWEL,OR,DSP,ST,BLUE,STD,6/PK,12PK/CS: Brand: MEDLINE

## (undated) DEVICE — Device

## (undated) DEVICE — GAUZE,SPONGE,4"X4",8PLY,STRL,LF,10/TRAY: Brand: MEDLINE

## (undated) DEVICE — GRADUATE TRIANG MEASURE 1000ML BLK PRNT

## (undated) DEVICE — DEFENDO AIR WATER SUCTION AND BIOPSY VALVE KIT FOR  OLYMPUS: Brand: DEFENDO AIR/WATER/SUCTION AND BIOPSY VALVE

## (undated) DEVICE — BLOCK BITE 60FR RUBBER ADLT DENTAL

## (undated) DEVICE — BITEBLOCK 54FR W/ DENT RIM BLOX